# Patient Record
Sex: FEMALE | Race: WHITE | NOT HISPANIC OR LATINO
[De-identification: names, ages, dates, MRNs, and addresses within clinical notes are randomized per-mention and may not be internally consistent; named-entity substitution may affect disease eponyms.]

---

## 2019-02-21 ENCOUNTER — APPOINTMENT (OUTPATIENT)
Dept: RHEUMATOLOGY | Facility: CLINIC | Age: 54
End: 2019-02-21

## 2019-09-09 ENCOUNTER — TRANSCRIPTION ENCOUNTER (OUTPATIENT)
Age: 54
End: 2019-09-09

## 2019-10-03 ENCOUNTER — INPATIENT (INPATIENT)
Facility: HOSPITAL | Age: 54
LOS: 1 days | Discharge: HOME | DRG: 247 | End: 2019-10-05
Attending: INTERNAL MEDICINE | Admitting: INTERNAL MEDICINE
Payer: MEDICAID

## 2019-10-03 VITALS
HEART RATE: 102 BPM | TEMPERATURE: 99 F | HEIGHT: 69 IN | RESPIRATION RATE: 18 BRPM | DIASTOLIC BLOOD PRESSURE: 89 MMHG | WEIGHT: 225.09 LBS | OXYGEN SATURATION: 96 % | SYSTOLIC BLOOD PRESSURE: 166 MMHG

## 2019-10-03 DIAGNOSIS — R07.9 CHEST PAIN, UNSPECIFIED: ICD-10-CM

## 2019-10-03 LAB
ALBUMIN SERPL ELPH-MCNC: 4.2 G/DL — SIGNIFICANT CHANGE UP (ref 3.3–5)
ALP SERPL-CCNC: 85 U/L — SIGNIFICANT CHANGE UP (ref 40–120)
ALT FLD-CCNC: 26 U/L — SIGNIFICANT CHANGE UP (ref 10–45)
ANION GAP SERPL CALC-SCNC: 9 MMOL/L — SIGNIFICANT CHANGE UP (ref 5–17)
AST SERPL-CCNC: 15 U/L — SIGNIFICANT CHANGE UP (ref 10–40)
BASE EXCESS BLDV CALC-SCNC: 4.3 MMOL/L — HIGH (ref -2–2)
BILIRUB SERPL-MCNC: 0.2 MG/DL — SIGNIFICANT CHANGE UP (ref 0.2–1.2)
BUN SERPL-MCNC: 14 MG/DL — SIGNIFICANT CHANGE UP (ref 7–23)
CA-I SERPL-SCNC: 1.28 MMOL/L — SIGNIFICANT CHANGE UP (ref 1.12–1.3)
CALCIUM SERPL-MCNC: 9.7 MG/DL — SIGNIFICANT CHANGE UP (ref 8.4–10.5)
CHLORIDE BLDV-SCNC: 105 MMOL/L — SIGNIFICANT CHANGE UP (ref 96–108)
CHLORIDE SERPL-SCNC: 102 MMOL/L — SIGNIFICANT CHANGE UP (ref 96–108)
CK MB CFR SERPL CALC: 1.2 NG/ML — SIGNIFICANT CHANGE UP (ref 0–3.8)
CK SERPL-CCNC: 50 U/L — SIGNIFICANT CHANGE UP (ref 25–170)
CO2 BLDV-SCNC: 31 MMOL/L — HIGH (ref 22–30)
CO2 SERPL-SCNC: 27 MMOL/L — SIGNIFICANT CHANGE UP (ref 22–31)
CREAT SERPL-MCNC: 0.51 MG/DL — SIGNIFICANT CHANGE UP (ref 0.5–1.3)
GAS PNL BLDV: 136 MMOL/L — SIGNIFICANT CHANGE UP (ref 135–145)
GAS PNL BLDV: SIGNIFICANT CHANGE UP
GAS PNL BLDV: SIGNIFICANT CHANGE UP
GLUCOSE BLDC GLUCOMTR-MCNC: 207 MG/DL — HIGH (ref 70–99)
GLUCOSE BLDV-MCNC: 156 MG/DL — HIGH (ref 70–99)
GLUCOSE SERPL-MCNC: 165 MG/DL — HIGH (ref 70–99)
HCO3 BLDV-SCNC: 30 MMOL/L — HIGH (ref 21–29)
HCT VFR BLD CALC: 38.9 % — SIGNIFICANT CHANGE UP (ref 34.5–45)
HCT VFR BLDA CALC: 42 % — SIGNIFICANT CHANGE UP (ref 39–50)
HGB BLD CALC-MCNC: 13.6 G/DL — SIGNIFICANT CHANGE UP (ref 11.5–15.5)
HGB BLD-MCNC: 13.2 G/DL — SIGNIFICANT CHANGE UP (ref 11.5–15.5)
LACTATE BLDV-MCNC: 1 MMOL/L — SIGNIFICANT CHANGE UP (ref 0.7–2)
MCHC RBC-ENTMCNC: 31.2 PG — SIGNIFICANT CHANGE UP (ref 27–34)
MCHC RBC-ENTMCNC: 33.9 GM/DL — SIGNIFICANT CHANGE UP (ref 32–36)
MCV RBC AUTO: 92 FL — SIGNIFICANT CHANGE UP (ref 80–100)
NRBC # BLD: 0 /100 WBCS — SIGNIFICANT CHANGE UP (ref 0–0)
PCO2 BLDV: 50 MMHG — SIGNIFICANT CHANGE UP (ref 35–50)
PH BLDV: 7.39 — SIGNIFICANT CHANGE UP (ref 7.35–7.45)
PLATELET # BLD AUTO: 239 K/UL — SIGNIFICANT CHANGE UP (ref 150–400)
PO2 BLDV: 24 MMHG — LOW (ref 25–45)
POTASSIUM BLDV-SCNC: 3.7 MMOL/L — SIGNIFICANT CHANGE UP (ref 3.5–5.3)
POTASSIUM SERPL-MCNC: 3.9 MMOL/L — SIGNIFICANT CHANGE UP (ref 3.5–5.3)
POTASSIUM SERPL-SCNC: 3.9 MMOL/L — SIGNIFICANT CHANGE UP (ref 3.5–5.3)
PROT SERPL-MCNC: 7.1 G/DL — SIGNIFICANT CHANGE UP (ref 6–8.3)
RBC # BLD: 4.23 M/UL — SIGNIFICANT CHANGE UP (ref 3.8–5.2)
RBC # FLD: 12.2 % — SIGNIFICANT CHANGE UP (ref 10.3–14.5)
SAO2 % BLDV: 42 % — LOW (ref 67–88)
SODIUM SERPL-SCNC: 138 MMOL/L — SIGNIFICANT CHANGE UP (ref 135–145)
TROPONIN T, HIGH SENSITIVITY RESULT: <6 NG/L — SIGNIFICANT CHANGE UP (ref 0–51)
TROPONIN T, HIGH SENSITIVITY RESULT: <6 NG/L — SIGNIFICANT CHANGE UP (ref 0–51)
WBC # BLD: 7.17 K/UL — SIGNIFICANT CHANGE UP (ref 3.8–10.5)
WBC # FLD AUTO: 7.17 K/UL — SIGNIFICANT CHANGE UP (ref 3.8–10.5)

## 2019-10-03 PROCEDURE — 93010 ELECTROCARDIOGRAM REPORT: CPT

## 2019-10-03 PROCEDURE — 99284 EMERGENCY DEPT VISIT MOD MDM: CPT

## 2019-10-03 PROCEDURE — 71046 X-RAY EXAM CHEST 2 VIEWS: CPT | Mod: 26

## 2019-10-03 RX ORDER — HEPARIN SODIUM 5000 [USP'U]/ML
5000 INJECTION INTRAVENOUS; SUBCUTANEOUS EVERY 12 HOURS
Refills: 0 | Status: DISCONTINUED | OUTPATIENT
Start: 2019-10-03 | End: 2019-10-05

## 2019-10-03 RX ORDER — INSULIN LISPRO 100/ML
VIAL (ML) SUBCUTANEOUS
Refills: 0 | Status: DISCONTINUED | OUTPATIENT
Start: 2019-10-03 | End: 2019-10-05

## 2019-10-03 RX ORDER — ASPIRIN/CALCIUM CARB/MAGNESIUM 324 MG
243 TABLET ORAL ONCE
Refills: 0 | Status: COMPLETED | OUTPATIENT
Start: 2019-10-03 | End: 2019-10-03

## 2019-10-03 RX ORDER — ATORVASTATIN CALCIUM 80 MG/1
80 TABLET, FILM COATED ORAL AT BEDTIME
Refills: 0 | Status: DISCONTINUED | OUTPATIENT
Start: 2019-10-03 | End: 2019-10-05

## 2019-10-03 RX ORDER — DEXTROSE 50 % IN WATER 50 %
12.5 SYRINGE (ML) INTRAVENOUS ONCE
Refills: 0 | Status: DISCONTINUED | OUTPATIENT
Start: 2019-10-03 | End: 2019-10-05

## 2019-10-03 RX ORDER — DEXTROSE 50 % IN WATER 50 %
25 SYRINGE (ML) INTRAVENOUS ONCE
Refills: 0 | Status: DISCONTINUED | OUTPATIENT
Start: 2019-10-03 | End: 2019-10-05

## 2019-10-03 RX ORDER — ASPIRIN/CALCIUM CARB/MAGNESIUM 324 MG
81 TABLET ORAL ONCE
Refills: 0 | Status: DISCONTINUED | OUTPATIENT
Start: 2019-10-03 | End: 2019-10-03

## 2019-10-03 RX ORDER — LOSARTAN POTASSIUM 100 MG/1
50 TABLET, FILM COATED ORAL DAILY
Refills: 0 | Status: DISCONTINUED | OUTPATIENT
Start: 2019-10-03 | End: 2019-10-05

## 2019-10-03 RX ORDER — DEXTROSE 50 % IN WATER 50 %
15 SYRINGE (ML) INTRAVENOUS ONCE
Refills: 0 | Status: DISCONTINUED | OUTPATIENT
Start: 2019-10-03 | End: 2019-10-05

## 2019-10-03 RX ORDER — INSULIN GLARGINE 100 [IU]/ML
10 INJECTION, SOLUTION SUBCUTANEOUS AT BEDTIME
Refills: 0 | Status: DISCONTINUED | OUTPATIENT
Start: 2019-10-03 | End: 2019-10-05

## 2019-10-03 RX ORDER — GLUCAGON INJECTION, SOLUTION 0.5 MG/.1ML
1 INJECTION, SOLUTION SUBCUTANEOUS ONCE
Refills: 0 | Status: DISCONTINUED | OUTPATIENT
Start: 2019-10-03 | End: 2019-10-05

## 2019-10-03 RX ORDER — SODIUM CHLORIDE 9 MG/ML
1000 INJECTION, SOLUTION INTRAVENOUS
Refills: 0 | Status: DISCONTINUED | OUTPATIENT
Start: 2019-10-03 | End: 2019-10-05

## 2019-10-03 RX ORDER — METOPROLOL TARTRATE 50 MG
25 TABLET ORAL
Refills: 0 | Status: DISCONTINUED | OUTPATIENT
Start: 2019-10-03 | End: 2019-10-05

## 2019-10-03 RX ORDER — ASPIRIN/CALCIUM CARB/MAGNESIUM 324 MG
81 TABLET ORAL DAILY
Refills: 0 | Status: DISCONTINUED | OUTPATIENT
Start: 2019-10-03 | End: 2019-10-05

## 2019-10-03 RX ADMIN — Medication 243 MILLIGRAM(S): at 19:34

## 2019-10-03 RX ADMIN — INSULIN GLARGINE 10 UNIT(S): 100 INJECTION, SOLUTION SUBCUTANEOUS at 22:48

## 2019-10-03 NOTE — ED PROVIDER NOTE - BIRTH SEX
Continue current warfarin and repeat IINR in 3 days.     Hilda Mckeon MD    
Noted  Forwarded to [facility].    Anticoag Track is updated    
Notification received from [facility] regarding Pt  Pt location at SNF: [address]    Assessment: Pt had PT/INR drawn  Results:  INR: 2.77  PT: 31.1    Current dose of Coumadin: 2 mg Tues/Thurs; 1 mg ROW  Last INR: 1.5 on 3/28    Any bleeding or bruising concerns: no  Any medication changes: no    Anticoag tracking form updated with result    Any recommendations or new orders?      
Female

## 2019-10-03 NOTE — H&P ADULT - ASSESSMENT
pt with  hx of htn, DM, strong fhx for cad with c/o intermittent new onset  of chest pain s/p recent stress test was supposed to be cath as out p[t , but developed chest pain again and was told to come to ER.  pt pain free now.  chest pain r/o ACVS  cardiac enzymes  asa beta blocker  lipitor  tsh  any chest pain will start iv heparin  DM fs with coverage

## 2019-10-03 NOTE — ED PROVIDER NOTE - OBJECTIVE STATEMENT
54 y female PMHx DM, HTN, fibromyalgia presenting for abnormal nuclear stress test last PM sent in from Dr Omar Villarreal -  was advised to "eat dinner today then come in" given pts diabetic status. Pt currently has intermittent "sharp" chest pain midsternal with mild SOB. Admits to occasional nausea, paresthesias around her mouth and left jaw pain. Denies exertional CP, postprandial CP, syncope, weakness, palpitations. Pt has positive FH - mother  6 months ago from stroke. father  10 years ago in his sleep suspected cardiac arrest.

## 2019-10-03 NOTE — ED PROVIDER NOTE - ATTENDING CONTRIBUTION TO CARE
I performed a history and physical exam of the patient and discussed their management with the PA.  I reviewed the PA's note and agree with the documented findings and plan of care except as noted below. My medical decision making and observations are as follows:     54F PMH DM, HTN, fibromyalgia presenting for abnormal nuclear stress test last PM sent in from Dr Nelson.  Pt currently has intermittent "sharp" chest pain midsternal with mild SOB. Admits to occasional nausea.  no radiation of pain.  patient currently endorses a slight discomfort 2/10.  States she is very anxious about coming to the hospital.  Endorses family hx of heart disease and stroke.  Pt in NAD, A&O x 3, heart mildly tachycardic, lungs cta, ab soft ntnd, no peripheral edema.  Concern for acs given abnormal stress - will check labs and admit for further cardiac work up.

## 2019-10-03 NOTE — ED ADULT NURSE NOTE - OBJECTIVE STATEMENT
54 year old female presents to the ED via walk in with c/o CP. Pt endorses an episode of CP and SOB a few days ago that prompted her to see a cardiologist,  found to have abnormal nuc stress test and EKG at cardiologist today and sent in for further cardiac work up and admission. Patient has no c/o CP/SOB at this time, denies any pain or discomfort, no N/V/D or diaphoresis. EKG done in traige, RSR-ST noted on tele monitor. Comfort and safety measures maintained.

## 2019-10-03 NOTE — ED PROVIDER NOTE - CLINICAL SUMMARY MEDICAL DECISION MAKING FREE TEXT BOX
Pt with chest pain and abnormal nuclear stress test sent in to the ER for admission.  WIll check labs, ekg and admit.

## 2019-10-03 NOTE — ED PROVIDER NOTE - PROGRESS NOTE DETAILS
Spoke to Dr Phil Nelson regarding his pt - advised admit for angiogram for inferior ischemia on nuclear stress test. Will admit to Juan Alberto Nelson. Brice Huddleston PA-C

## 2019-10-03 NOTE — ED PROVIDER NOTE - FAMILY HISTORY
Mother  Still living? No  Family history of stroke, Age at diagnosis: Age Unknown     Father  Still living? No  Family history of cardiac arrest, Age at diagnosis: Age Unknown

## 2019-10-03 NOTE — H&P ADULT - HISTORY OF PRESENT ILLNESS
CHIEF COMPLAINT:Patient is a 54y old  Female who presents with a chief complaint of chest pain.    HPI:  54 y female PMHx DM, HTN, fibromyalgia presenting for abnormal nuclear stress test last PM sent in from Dr Omar Villarreal -  was advised to "eat dinner today then come in" given pts diabetic status. Pt currently has intermittent "sharp" chest pain midsternal with mild SOB. Admits to occasional nausea, paresthesias around her mouth and left jaw pain. Denies exertional CP, postprandial CP, syncope, weakness, palpitations. Pt has positive FH - mother  6 months ago from stroke. father  10 years ago in his sleep suspected cardiac    PAST MEDICAL & SURGICAL HISTORY:  Fibromyalgia  Hypertension  Diabetes  No significant past surgical history      MEDICATIONS  (STANDING):    MEDICATIONS  (PRN):      FAMILY HISTORY:  Family history of cardiac arrest  Family history of stroke      SOCIAL HISTORY:    [ ] Non-smoker  [ ] Smoker  [ ] Alcohol    Allergies    Cipro (Short breath)    Intolerances    	    REVIEW OF SYSTEMS:  CONSTITUTIONAL: No fever, weight loss, or fatigue  EYES: No eye pain, visual disturbances, or discharge  ENT:  No difficulty hearing, tinnitus, vertigo; No sinus or throat pain  NECK: No pain or stiffness  RESPIRATORY: No cough, wheezing, chills or hemoptysis; + exertional  Shortness of Breath  CARDIOVASCULAR: No chest pain, palpitations, passing out, dizziness, or leg swelling  GASTROINTESTINAL: No abdominal or epigastric pain. No nausea, vomiting, or hematemesis; No diarrhea or constipation. No melena or hematochezia.  GENITOURINARY: No dysuria, frequency, hematuria, or incontinence  NEUROLOGICAL: No headaches, memory loss, loss of strength, numbness, or tremors  SKIN: No itching, burning, rashes, or lesions   LYMPH Nodes: No enlarged glands  ENDOCRINE: No heat or cold intolerance; No hair loss  MUSCULOSKELETAL: No joint pain or swelling; No muscle, back, or extremity pain  PSYCHIATRIC: No depression, anxiety, mood swings, or difficulty sleeping  HEME/LYMPH: No easy bruising, or bleeding gums  ALLERGY AND IMMUNOLOGIC: No hives or eczema	    [ ] All others negative	  [ ] Unable to obtain    PHYSICAL EXAM:  T(C): 37.1 (10-03-19 @ 18:39), Max: 37.1 (10-03-19 @ 18:39)  HR: 87 (10-03-19 @ 19:59) (87 - 102)  BP: 147/87 (10-03-19 @ 19:59) (147/87 - 166/89)  RR: 18 (10-03-19 @ 19:59) (18 - 18)  SpO2: 98% (10-03-19 @ 19:59) (96% - 98%)  Wt(kg): --  I&O's Summary      Appearance: Normal	  HEENT:   Normal oral mucosa, PERRL, EOMI	  Lymphatic: No lymphadenopathy  Cardiovascular: Normal S1 S2, No JVD, + murmurs, No edema  Respiratory: rhonchi  Psychiatry: A & O x 3, Mood & affect appropriate  Gastrointestinal:  Soft, Non-tender, + BS	  Skin: No rashes, No ecchymoses, No cyanosis	  Neurologic: Non-focal  Extremities: Normal range of motion, No clubbing, cyanosis or edema  Vascular: Peripheral pulses palpable 2+ bilaterally    TELEMETRY: 	    ECG:  	  RADIOLOGY:  OTHER: 	  	  LABS:	 	    CARDIAC MARKERS:                              13.2   7.17  )-----------( 239      ( 03 Oct 2019 19:22 )             38.9     10    138  |  102  |  14  ----------------------------<  165<H>  3.9   |  27  |  0.51    Ca    9.7      03 Oct 2019 19:22    TPro  7.1  /  Alb  4.2  /  TBili  0.2  /  DBili  x   /  AST  15  /  ALT  26  /  AlkPhos  85  10-    proBNP:   Lipid Profile:   HgA1c:   TSH:       PREVIOUS DIAGNOSTIC TESTING:      < from: Xray Chest 2 Views PA/Lat (10.03.19 @ 19:44) >  INTERPRETATION:  Clear lungs    < end of copied text >

## 2019-10-04 LAB
ALBUMIN SERPL ELPH-MCNC: 3.8 G/DL — SIGNIFICANT CHANGE UP (ref 3.3–5)
ALP SERPL-CCNC: 78 U/L — SIGNIFICANT CHANGE UP (ref 40–120)
ALT FLD-CCNC: 25 U/L — SIGNIFICANT CHANGE UP (ref 10–45)
ANION GAP SERPL CALC-SCNC: 10 MMOL/L — SIGNIFICANT CHANGE UP (ref 5–17)
APPEARANCE UR: CLEAR — SIGNIFICANT CHANGE UP
AST SERPL-CCNC: 16 U/L — SIGNIFICANT CHANGE UP (ref 10–40)
BACTERIA # UR AUTO: NEGATIVE — SIGNIFICANT CHANGE UP
BILIRUB SERPL-MCNC: 0.3 MG/DL — SIGNIFICANT CHANGE UP (ref 0.2–1.2)
BILIRUB UR-MCNC: NEGATIVE — SIGNIFICANT CHANGE UP
BUN SERPL-MCNC: 16 MG/DL — SIGNIFICANT CHANGE UP (ref 7–23)
CALCIUM SERPL-MCNC: 9.7 MG/DL — SIGNIFICANT CHANGE UP (ref 8.4–10.5)
CHLORIDE SERPL-SCNC: 104 MMOL/L — SIGNIFICANT CHANGE UP (ref 96–108)
CHOLEST SERPL-MCNC: 172 MG/DL — SIGNIFICANT CHANGE UP (ref 10–199)
CO2 SERPL-SCNC: 25 MMOL/L — SIGNIFICANT CHANGE UP (ref 22–31)
COLOR SPEC: YELLOW — SIGNIFICANT CHANGE UP
CREAT SERPL-MCNC: 0.41 MG/DL — LOW (ref 0.5–1.3)
DIFF PNL FLD: ABNORMAL
EPI CELLS # UR: 0 /HPF — SIGNIFICANT CHANGE UP
GLUCOSE BLDC GLUCOMTR-MCNC: 191 MG/DL — HIGH (ref 70–99)
GLUCOSE BLDC GLUCOMTR-MCNC: 194 MG/DL — HIGH (ref 70–99)
GLUCOSE BLDC GLUCOMTR-MCNC: 217 MG/DL — HIGH (ref 70–99)
GLUCOSE BLDC GLUCOMTR-MCNC: 217 MG/DL — HIGH (ref 70–99)
GLUCOSE SERPL-MCNC: 130 MG/DL — HIGH (ref 70–99)
GLUCOSE UR QL: ABNORMAL
HBA1C BLD-MCNC: 8.5 % — HIGH (ref 4–5.6)
HCT VFR BLD CALC: 39.5 % — SIGNIFICANT CHANGE UP (ref 34.5–45)
HCV AB S/CO SERPL IA: 0.17 S/CO — SIGNIFICANT CHANGE UP (ref 0–0.99)
HCV AB SERPL-IMP: SIGNIFICANT CHANGE UP
HDLC SERPL-MCNC: 45 MG/DL — LOW
HGB BLD-MCNC: 13 G/DL — SIGNIFICANT CHANGE UP (ref 11.5–15.5)
HYALINE CASTS # UR AUTO: 7 /LPF — HIGH (ref 0–2)
KETONES UR-MCNC: NEGATIVE — SIGNIFICANT CHANGE UP
LEUKOCYTE ESTERASE UR-ACNC: ABNORMAL
LIPID PNL WITH DIRECT LDL SERPL: 104 MG/DL — HIGH
MCHC RBC-ENTMCNC: 30.5 PG — SIGNIFICANT CHANGE UP (ref 27–34)
MCHC RBC-ENTMCNC: 32.9 GM/DL — SIGNIFICANT CHANGE UP (ref 32–36)
MCV RBC AUTO: 92.7 FL — SIGNIFICANT CHANGE UP (ref 80–100)
NITRITE UR-MCNC: NEGATIVE — SIGNIFICANT CHANGE UP
NRBC # BLD: 0 /100 WBCS — SIGNIFICANT CHANGE UP (ref 0–0)
PH UR: 5.5 — SIGNIFICANT CHANGE UP (ref 5–8)
PLATELET # BLD AUTO: 229 K/UL — SIGNIFICANT CHANGE UP (ref 150–400)
POTASSIUM SERPL-MCNC: 3.8 MMOL/L — SIGNIFICANT CHANGE UP (ref 3.5–5.3)
POTASSIUM SERPL-SCNC: 3.8 MMOL/L — SIGNIFICANT CHANGE UP (ref 3.5–5.3)
PROT SERPL-MCNC: 6.6 G/DL — SIGNIFICANT CHANGE UP (ref 6–8.3)
PROT UR-MCNC: NEGATIVE — SIGNIFICANT CHANGE UP
RBC # BLD: 4.26 M/UL — SIGNIFICANT CHANGE UP (ref 3.8–5.2)
RBC # FLD: 12.2 % — SIGNIFICANT CHANGE UP (ref 10.3–14.5)
RBC CASTS # UR COMP ASSIST: 2 /HPF — SIGNIFICANT CHANGE UP (ref 0–4)
SODIUM SERPL-SCNC: 139 MMOL/L — SIGNIFICANT CHANGE UP (ref 135–145)
SP GR SPEC: 1.02 — SIGNIFICANT CHANGE UP (ref 1.01–1.02)
TOTAL CHOLESTEROL/HDL RATIO MEASUREMENT: 3.8 RATIO — SIGNIFICANT CHANGE UP (ref 3.3–7.1)
TRIGL SERPL-MCNC: 115 MG/DL — SIGNIFICANT CHANGE UP (ref 10–149)
TROPONIN T, HIGH SENSITIVITY RESULT: <6 NG/L — SIGNIFICANT CHANGE UP (ref 0–51)
TSH SERPL-MCNC: 1.46 UIU/ML — SIGNIFICANT CHANGE UP (ref 0.27–4.2)
UROBILINOGEN FLD QL: NEGATIVE — SIGNIFICANT CHANGE UP
WBC # BLD: 5.48 K/UL — SIGNIFICANT CHANGE UP (ref 3.8–10.5)
WBC # FLD AUTO: 5.48 K/UL — SIGNIFICANT CHANGE UP (ref 3.8–10.5)
WBC UR QL: 31 /HPF — HIGH (ref 0–5)

## 2019-10-04 PROCEDURE — 93454 CORONARY ARTERY ANGIO S&I: CPT | Mod: 26,59

## 2019-10-04 PROCEDURE — 93010 ELECTROCARDIOGRAM REPORT: CPT

## 2019-10-04 PROCEDURE — 92928 PRQ TCAT PLMT NTRAC ST 1 LES: CPT | Mod: LD

## 2019-10-04 RX ORDER — CLOPIDOGREL BISULFATE 75 MG/1
75 TABLET, FILM COATED ORAL DAILY
Refills: 0 | Status: DISCONTINUED | OUTPATIENT
Start: 2019-10-05 | End: 2019-10-05

## 2019-10-04 RX ADMIN — Medication 81 MILLIGRAM(S): at 11:50

## 2019-10-04 RX ADMIN — LOSARTAN POTASSIUM 50 MILLIGRAM(S): 100 TABLET, FILM COATED ORAL at 08:20

## 2019-10-04 RX ADMIN — Medication 2: at 08:20

## 2019-10-04 RX ADMIN — INSULIN GLARGINE 10 UNIT(S): 100 INJECTION, SOLUTION SUBCUTANEOUS at 22:04

## 2019-10-04 RX ADMIN — Medication 1: at 14:22

## 2019-10-04 NOTE — PROVIDER CONTACT NOTE (MEDICATION) - SITUATION
Pt continuing to refusing heparin and metoprolol. As per pt , she wants to discuss taking these new medications w/ he PCP.

## 2019-10-04 NOTE — PROVIDER CONTACT NOTE (OTHER) - ASSESSMENT
Pt A&Ox4, VSS, SR on tele, Denies cp, sob, and distress, Pt refusing medication, Pt expressed concern for possible medication reaction since she has not taken Metoprolol and Lipitor prior to hospitalization

## 2019-10-04 NOTE — PROGRESS NOTE ADULT - SUBJECTIVE AND OBJECTIVE BOX
CARDIOLOGY     PROGRESS  NOTE   ________________________________________________    CHIEF COMPLAINT:Patient is a 54y old  Female who presents with a chief complaint of chest pain (03 Oct 2019 20:38)    	  REVIEW OF SYSTEMS:  CONSTITUTIONAL: No fever, weight loss, or fatigue  EYES: No eye pain, visual disturbances, or discharge  ENT:  No difficulty hearing, tinnitus, vertigo; No sinus or throat pain  NECK: No pain or stiffness  RESPIRATORY: No cough, wheezing, chills or hemoptysis; No Shortness of Breath  CARDIOVASCULAR: No chest pain, palpitations, passing out, dizziness, or leg swelling  GASTROINTESTINAL: No abdominal or epigastric pain. No nausea, vomiting, or hematemesis; No diarrhea or constipation. No melena or hematochezia.  GENITOURINARY: No dysuria, frequency, hematuria, or incontinence  NEUROLOGICAL: No headaches, memory loss, loss of strength, numbness, or tremors  SKIN: No itching, burning, rashes, or lesions   LYMPH Nodes: No enlarged glands  ENDOCRINE: No heat or cold intolerance; No hair loss  MUSCULOSKELETAL: No joint pain or swelling; No muscle, back, or extremity pain  PSYCHIATRIC: No depression, anxiety, mood swings, or difficulty sleeping  HEME/LYMPH: No easy bruising, or bleeding gums  ALLERGY AND IMMUNOLOGIC: No hives or eczema	    [ ] All others negative	  [ ] Unable to obtain    PHYSICAL EXAM:  T(C): 36.8 (10-04-19 @ 05:30), Max: 37.1 (10-03-19 @ 18:39)  HR: 70 (10-04-19 @ 05:30) (70 - 102)  BP: 148/80 (10-04-19 @ 05:30) (147/87 - 166/89)  RR: 17 (10-04-19 @ 05:30) (17 - 18)  SpO2: 97% (10-04-19 @ 05:30) (96% - 98%)  Wt(kg): --  I&O's Summary      Appearance: Normal	  HEENT:   Normal oral mucosa, PERRL, EOMI	  Lymphatic: No lymphadenopathy  Cardiovascular: Normal S1 S2, No JVD, No murmurs, No edema  Respiratory: Lungs clear to auscultation	  Psychiatry: A & O x 3, Mood & affect appropriate  Gastrointestinal:  Soft, Non-tender, + BS	  Skin: No rashes, No ecchymoses, No cyanosis	  Neurologic: Non-focal  Extremities: Normal range of motion, No clubbing, cyanosis or edema  Vascular: Peripheral pulses palpable 2+ bilaterally    MEDICATIONS  (STANDING):  aspirin enteric coated 81 milliGRAM(s) Oral daily  atorvastatin 80 milliGRAM(s) Oral at bedtime  dextrose 5%. 1000 milliLiter(s) (50 mL/Hr) IV Continuous <Continuous>  dextrose 50% Injectable 12.5 Gram(s) IV Push once  dextrose 50% Injectable 25 Gram(s) IV Push once  dextrose 50% Injectable 25 Gram(s) IV Push once  heparin  Injectable 5000 Unit(s) SubCutaneous every 12 hours  insulin glargine Injectable (LANTUS) 10 Unit(s) SubCutaneous at bedtime  insulin lispro (HumaLOG) corrective regimen sliding scale   SubCutaneous three times a day before meals  losartan 50 milliGRAM(s) Oral daily  metoprolol tartrate 25 milliGRAM(s) Oral two times a day      TELEMETRY: 	    ECG:  	  RADIOLOGY:  OTHER: 	  	  LABS:	 	    CARDIAC MARKERS:  CARDIAC MARKERS ( 03 Oct 2019 22:31 )  x     / x     / 50 U/L / x     / 1.2 ng/mL                                13.0   5.48  )-----------( 229      ( 04 Oct 2019 07:47 )             39.5     10-04    139  |  104  |  16  ----------------------------<  130<H>  3.8   |  25  |  0.41<L>    Ca    9.7      04 Oct 2019 07:08    TPro  6.6  /  Alb  3.8  /  TBili  0.3  /  DBili  x   /  AST  16  /  ALT  25  /  AlkPhos  78  10-04    proBNP:   Lipid Profile:   HgA1c:   TSH:         Assessment and plan  ---------------------------  unstable angina  awaiting cath today continue asa/beta blocker

## 2019-10-04 NOTE — PROVIDER CONTACT NOTE (OTHER) - ACTION/TREATMENT ORDERED:
PA notified, Pt educated on importance of beta blocker and statin, Pt given written material on both medications, Pt states understanding, Will continue to monitor

## 2019-10-04 NOTE — PROVIDER CONTACT NOTE (MEDICATION) - ASSESSMENT
Pt denies current CP, SOB, N/V, dizziness. VSS (see flow sheet)  Education provided on importance of medication and current diagnosis.

## 2019-10-04 NOTE — CHART NOTE - NSCHARTNOTEFT_GEN_A_CORE
Patient underwent a PCI procedure and is being admitted as they are at increased risk for major adverse cardiac and vascular events if discharged due to the following high risk characteristics:  for pLAD lesion

## 2019-10-05 ENCOUNTER — TRANSCRIPTION ENCOUNTER (OUTPATIENT)
Age: 54
End: 2019-10-05

## 2019-10-05 VITALS
TEMPERATURE: 98 F | DIASTOLIC BLOOD PRESSURE: 76 MMHG | SYSTOLIC BLOOD PRESSURE: 136 MMHG | OXYGEN SATURATION: 97 % | HEART RATE: 83 BPM | RESPIRATION RATE: 18 BRPM

## 2019-10-05 LAB
ANION GAP SERPL CALC-SCNC: 10 MMOL/L — SIGNIFICANT CHANGE UP (ref 5–17)
BUN SERPL-MCNC: 16 MG/DL — SIGNIFICANT CHANGE UP (ref 7–23)
CALCIUM SERPL-MCNC: 9.6 MG/DL — SIGNIFICANT CHANGE UP (ref 8.4–10.5)
CHLORIDE SERPL-SCNC: 104 MMOL/L — SIGNIFICANT CHANGE UP (ref 96–108)
CO2 SERPL-SCNC: 25 MMOL/L — SIGNIFICANT CHANGE UP (ref 22–31)
CREAT SERPL-MCNC: 0.48 MG/DL — LOW (ref 0.5–1.3)
GLUCOSE BLDC GLUCOMTR-MCNC: 172 MG/DL — HIGH (ref 70–99)
GLUCOSE SERPL-MCNC: 172 MG/DL — HIGH (ref 70–99)
HCT VFR BLD CALC: 39.8 % — SIGNIFICANT CHANGE UP (ref 34.5–45)
HGB BLD-MCNC: 13.2 G/DL — SIGNIFICANT CHANGE UP (ref 11.5–15.5)
MCHC RBC-ENTMCNC: 30.8 PG — SIGNIFICANT CHANGE UP (ref 27–34)
MCHC RBC-ENTMCNC: 33.2 GM/DL — SIGNIFICANT CHANGE UP (ref 32–36)
MCV RBC AUTO: 93 FL — SIGNIFICANT CHANGE UP (ref 80–100)
NRBC # BLD: 0 /100 WBCS — SIGNIFICANT CHANGE UP (ref 0–0)
PLATELET # BLD AUTO: 195 K/UL — SIGNIFICANT CHANGE UP (ref 150–400)
POTASSIUM SERPL-MCNC: 4.4 MMOL/L — SIGNIFICANT CHANGE UP (ref 3.5–5.3)
POTASSIUM SERPL-SCNC: 4.4 MMOL/L — SIGNIFICANT CHANGE UP (ref 3.5–5.3)
RBC # BLD: 4.28 M/UL — SIGNIFICANT CHANGE UP (ref 3.8–5.2)
RBC # FLD: 12.2 % — SIGNIFICANT CHANGE UP (ref 10.3–14.5)
SODIUM SERPL-SCNC: 139 MMOL/L — SIGNIFICANT CHANGE UP (ref 135–145)
WBC # BLD: 6.62 K/UL — SIGNIFICANT CHANGE UP (ref 3.8–10.5)
WBC # FLD AUTO: 6.62 K/UL — SIGNIFICANT CHANGE UP (ref 3.8–10.5)

## 2019-10-05 RX ORDER — METOPROLOL TARTRATE 50 MG
1 TABLET ORAL
Qty: 30 | Refills: 0
Start: 2019-10-05 | End: 2019-11-03

## 2019-10-05 RX ORDER — CLOPIDOGREL BISULFATE 75 MG/1
1 TABLET, FILM COATED ORAL
Qty: 30 | Refills: 0
Start: 2019-10-05 | End: 2019-11-03

## 2019-10-05 RX ORDER — ASPIRIN/CALCIUM CARB/MAGNESIUM 324 MG
1 TABLET ORAL
Qty: 0 | Refills: 0 | DISCHARGE
Start: 2019-10-05

## 2019-10-05 RX ORDER — ASPIRIN/CALCIUM CARB/MAGNESIUM 324 MG
1 TABLET ORAL
Qty: 0 | Refills: 0 | DISCHARGE

## 2019-10-05 RX ORDER — ATORVASTATIN CALCIUM 80 MG/1
1 TABLET, FILM COATED ORAL
Qty: 30 | Refills: 0
Start: 2019-10-05 | End: 2019-11-03

## 2019-10-05 RX ADMIN — Medication 1: at 07:39

## 2019-10-05 RX ADMIN — LOSARTAN POTASSIUM 50 MILLIGRAM(S): 100 TABLET, FILM COATED ORAL at 07:38

## 2019-10-05 RX ADMIN — Medication 81 MILLIGRAM(S): at 11:30

## 2019-10-05 RX ADMIN — CLOPIDOGREL BISULFATE 75 MILLIGRAM(S): 75 TABLET, FILM COATED ORAL at 11:30

## 2019-10-05 NOTE — DISCHARGE NOTE NURSING/CASE MANAGEMENT/SOCIAL WORK - NSDCPEWEB_GEN_ALL_CORE
[place] website --- [email or web address] for Tobacco Control website --- [email or web address]

## 2019-10-05 NOTE — DISCHARGE NOTE NURSING/CASE MANAGEMENT/SOCIAL WORK - NSDCPEEMAIL_GEN_ALL_CORE
[facility] for Tobacco Control email [email or web address]

## 2019-10-05 NOTE — DISCHARGE NOTE PROVIDER - NSDCCPCAREPLAN_GEN_ALL_CORE_FT
PRINCIPAL DISCHARGE DIAGNOSIS  Diagnosis: Chest pain  Assessment and Plan of Treatment: You have had a cardiac stent placed. It is CRITICAL that you take your Plavix (Clopidogrel) exactly as prescribed and do NOT miss any doses as this could result in your stent closing. If you develop unusual bleeding or bruising notify your Cardiologist. You may bruise more easily so try to avoid injuries. Take your medications as prescribed and follow up with your Primary MD within 1 week and Cardiologist Dr Nelson in 2 weeks. Call offices for appointments.      SECONDARY DISCHARGE DIAGNOSES  Diagnosis: Type 2 diabetes mellitus  Assessment and Plan of Treatment: Continue diabetic medications and regime as previously prescribed and follow up with your Primary MD for continued management within 1 week.

## 2019-10-05 NOTE — PROGRESS NOTE ADULT - SUBJECTIVE AND OBJECTIVE BOX
CARDIOLOGY     PROGRESS  NOTE   ________________________________________________    CHIEF COMPLAINT:Patient is a 54y old  Female who presents with a chief complaint of chest pain (04 Oct 2019 09:27)  doing well, no complain.  	  REVIEW OF SYSTEMS:  CONSTITUTIONAL: No fever, weight loss, or fatigue  EYES: No eye pain, visual disturbances, or discharge  ENT:  No difficulty hearing, tinnitus, vertigo; No sinus or throat pain  NECK: No pain or stiffness  RESPIRATORY: No cough, wheezing, chills or hemoptysis; No Shortness of Breath  CARDIOVASCULAR: No chest pain, palpitations, passing out, dizziness, or leg swelling  GASTROINTESTINAL: No abdominal or epigastric pain. No nausea, vomiting, or hematemesis; No diarrhea or constipation. No melena or hematochezia.  GENITOURINARY: No dysuria, frequency, hematuria, or incontinence  NEUROLOGICAL: No headaches, memory loss, loss of strength, numbness, or tremors  SKIN: No itching, burning, rashes, or lesions   LYMPH Nodes: No enlarged glands  ENDOCRINE: No heat or cold intolerance; No hair loss  MUSCULOSKELETAL: No joint pain or swelling; No muscle, back, or extremity pain  PSYCHIATRIC: No depression, anxiety, mood swings, or difficulty sleeping  HEME/LYMPH: No easy bruising, or bleeding gums  ALLERGY AND IMMUNOLOGIC: No hives or eczema	    [ ] All others negative	  [ ] Unable to obtain    PHYSICAL EXAM:  T(C): 36.8 (10-05-19 @ 05:49), Max: 36.8 (10-04-19 @ 12:04)  HR: 77 (10-05-19 @ 07:37) (75 - 81)  BP: 152/75 (10-05-19 @ 07:37) (114/68 - 152/75)  RR: 18 (10-05-19 @ 05:49) (18 - 18)  SpO2: 99% (10-05-19 @ 05:49) (96% - 99%)  Wt(kg): --  I&O's Summary    04 Oct 2019 07:01  -  05 Oct 2019 07:00  --------------------------------------------------------  IN: 240 mL / OUT: 0 mL / NET: 240 mL        Appearance: Normal	  HEENT:   Normal oral mucosa, PERRL, EOMI	  Lymphatic: No lymphadenopathy  Cardiovascular: Normal S1 S2, No JVD, + murmurs, No edema  Respiratory: Lungs clear to auscultation	  Psychiatry: A & O x 3, Mood & affect appropriate  Gastrointestinal:  Soft, Non-tender, + BS	  Skin: No rashes, No ecchymoses, No cyanosis	  Neurologic: Non-focal  Extremities: Normal range of motion, No clubbing, cyanosis or edema  Vascular: Peripheral pulses palpable 2+ bilaterally    MEDICATIONS  (STANDING):  aspirin enteric coated 81 milliGRAM(s) Oral daily  atorvastatin 80 milliGRAM(s) Oral at bedtime  clopidogrel Tablet 75 milliGRAM(s) Oral daily  dextrose 5%. 1000 milliLiter(s) (50 mL/Hr) IV Continuous <Continuous>  dextrose 50% Injectable 12.5 Gram(s) IV Push once  dextrose 50% Injectable 25 Gram(s) IV Push once  dextrose 50% Injectable 25 Gram(s) IV Push once  heparin  Injectable 5000 Unit(s) SubCutaneous every 12 hours  insulin glargine Injectable (LANTUS) 10 Unit(s) SubCutaneous at bedtime  insulin lispro (HumaLOG) corrective regimen sliding scale   SubCutaneous three times a day before meals  losartan 50 milliGRAM(s) Oral daily  metoprolol tartrate 25 milliGRAM(s) Oral two times a day      TELEMETRY: 	    ECG:  	  RADIOLOGY:  OTHER: 	  	  LABS:	 	    CARDIAC MARKERS:  CARDIAC MARKERS ( 03 Oct 2019 22:31 )  x     / x     / 50 U/L / x     / 1.2 ng/mL                                13.2   6.62  )-----------( 195      ( 05 Oct 2019 06:07 )             39.8     10-05    139  |  104  |  16  ----------------------------<  172<H>  4.4   |  25  |  0.48<L>    Ca    9.6      05 Oct 2019 06:07    TPro  6.6  /  Alb  3.8  /  TBili  0.3  /  DBili  x   /  AST  16  /  ALT  25  /  AlkPhos  78  10-04    proBNP:   Lipid Profile: Cholesterol 172    HDL 45      HgA1c: Hemoglobin A1C, Whole Blood: 8.5 % (10-04 @ 11:14)    TSH: Thyroid Stimulating Hormone, Serum: 1.46 uIU/mL (10-04 @ 09:39)          Assessment and plan  ---------------------------  doing well, no complain  s/p stent of mid LAD  continue  asa and plavix  beta blocker change to ER 50 mg daily  continue arb as what she has at home fu in 2 weeks

## 2019-10-05 NOTE — DISCHARGE NOTE PROVIDER - HOSPITAL COURSE
54 y female PMHx DMT2, HTN, fibromyalgia presenting for abnormal nuclear stress test from Dr Phil Nelson office. Pt c/o intermittent "sharp" chest pain midsternal with mild SOB and occasional nausea, paresthesias around her mouth and left jaw pain. Denied exertional CP, postprandial CP, syncope, weakness, palpitations. Pt has positive FH - mother  6 months ago from stroke. father  10 years ago in his sleep suspected cardiac event.        s/p cardiac cath via Rt radial on 10/4/19 with stent of mid LAD    D/C on ASA 81, Plavix, Metoprolol change to Toprol XL 50 daily, Valsartan, Lipitor 40 daily        Stable for discharge home with F/U with PMD Dr Narciso Andujar within 1 week and Cardiologist Dr Nelson in 2 weeks.

## 2019-10-05 NOTE — DISCHARGE NOTE PROVIDER - CARE PROVIDER_API CALL
Narciso Andujar  Primary Care MD  Phone: (   )    -  Fax: (   )    -  Follow Up Time: 1 week    Phil Nelson (DO)  Cardiology Medicine  [facility], [address] [place] [address]  Phone: [phone]  Fax: [phone]  Follow Up Time: 2 weeks

## 2019-10-05 NOTE — DISCHARGE NOTE PROVIDER - PROVIDER TOKENS
FREE:[LAST:[Wyn],FIRST:[Narciso],PHONE:[(   )    -],FAX:[(   )    -],ADDRESS:[Primary Care MD],FOLLOWUP:[1 week]],PROVIDER:[TOKEN:[[id number]],FOLLOWUP:[2 weeks]]

## 2019-10-05 NOTE — DISCHARGE NOTE NURSING/CASE MANAGEMENT/SOCIAL WORK - PATIENT PORTAL LINK FT
You can access the FollowMyHealth Patient Portal offered by [facility] by registering at the following website: [email or web address]. By joining [employer]’s FollowMyHealth portal, you will also be able to view your health information using other applications (apps) compatible with our system.

## 2019-10-05 NOTE — CHART NOTE - NSCHARTNOTEFT_GEN_A_CORE
s/p mid LAD stent x 1, on ASA & Plavix. Seen and evaluated. Ambulating and tolerating well. radial site benign with no bleeding/ hematoma. Dressing changed. Plan to discharge home now. Vital signs stable

## 2019-10-19 ENCOUNTER — EMERGENCY (EMERGENCY)
Facility: HOSPITAL | Age: 54
LOS: 1 days | Discharge: HOME | End: 2019-10-19
Attending: PERSONAL EMERGENCY RESPONSE ATTENDANT
Payer: MEDICAID

## 2019-10-19 VITALS
DIASTOLIC BLOOD PRESSURE: 83 MMHG | TEMPERATURE: 99 F | HEART RATE: 90 BPM | SYSTOLIC BLOOD PRESSURE: 166 MMHG | OXYGEN SATURATION: 99 % | RESPIRATION RATE: 18 BRPM | HEIGHT: 69 IN | WEIGHT: 225.09 LBS

## 2019-10-19 VITALS
RESPIRATION RATE: 18 BRPM | SYSTOLIC BLOOD PRESSURE: 141 MMHG | DIASTOLIC BLOOD PRESSURE: 82 MMHG | TEMPERATURE: 98 F | OXYGEN SATURATION: 98 % | HEART RATE: 94 BPM

## 2019-10-19 PROBLEM — E11.9 TYPE 2 DIABETES MELLITUS WITHOUT COMPLICATIONS: Chronic | Status: ACTIVE | Noted: 2019-10-03

## 2019-10-19 PROBLEM — M79.7 FIBROMYALGIA: Chronic | Status: ACTIVE | Noted: 2019-10-03

## 2019-10-19 PROBLEM — I10 ESSENTIAL (PRIMARY) HYPERTENSION: Chronic | Status: ACTIVE | Noted: 2019-10-03

## 2019-10-19 LAB
ALBUMIN SERPL ELPH-MCNC: 4.1 G/DL — SIGNIFICANT CHANGE UP (ref 3.3–5)
ALP SERPL-CCNC: 94 U/L — SIGNIFICANT CHANGE UP (ref 40–120)
ALT FLD-CCNC: 52 U/L — HIGH (ref 10–45)
ANION GAP SERPL CALC-SCNC: 12 MMOL/L — SIGNIFICANT CHANGE UP (ref 5–17)
AST SERPL-CCNC: 32 U/L — SIGNIFICANT CHANGE UP (ref 10–40)
BASOPHILS # BLD AUTO: 0.02 K/UL — SIGNIFICANT CHANGE UP (ref 0–0.2)
BASOPHILS NFR BLD AUTO: 0.2 % — SIGNIFICANT CHANGE UP (ref 0–2)
BILIRUB SERPL-MCNC: 0.2 MG/DL — SIGNIFICANT CHANGE UP (ref 0.2–1.2)
BUN SERPL-MCNC: 18 MG/DL — SIGNIFICANT CHANGE UP (ref 7–23)
CALCIUM SERPL-MCNC: 9.6 MG/DL — SIGNIFICANT CHANGE UP (ref 8.4–10.5)
CHLORIDE SERPL-SCNC: 101 MMOL/L — SIGNIFICANT CHANGE UP (ref 96–108)
CO2 SERPL-SCNC: 25 MMOL/L — SIGNIFICANT CHANGE UP (ref 22–31)
CREAT SERPL-MCNC: 0.45 MG/DL — LOW (ref 0.5–1.3)
EOSINOPHIL # BLD AUTO: 0.13 K/UL — SIGNIFICANT CHANGE UP (ref 0–0.5)
EOSINOPHIL NFR BLD AUTO: 1.3 % — SIGNIFICANT CHANGE UP (ref 0–6)
GLUCOSE SERPL-MCNC: 141 MG/DL — HIGH (ref 70–99)
HCT VFR BLD CALC: 41 % — SIGNIFICANT CHANGE UP (ref 34.5–45)
HGB BLD-MCNC: 13.7 G/DL — SIGNIFICANT CHANGE UP (ref 11.5–15.5)
IMM GRANULOCYTES NFR BLD AUTO: 0.4 % — SIGNIFICANT CHANGE UP (ref 0–1.5)
LYMPHOCYTES # BLD AUTO: 2.36 K/UL — SIGNIFICANT CHANGE UP (ref 1–3.3)
LYMPHOCYTES # BLD AUTO: 24.4 % — SIGNIFICANT CHANGE UP (ref 13–44)
MCHC RBC-ENTMCNC: 30.5 PG — SIGNIFICANT CHANGE UP (ref 27–34)
MCHC RBC-ENTMCNC: 33.4 GM/DL — SIGNIFICANT CHANGE UP (ref 32–36)
MCV RBC AUTO: 91.3 FL — SIGNIFICANT CHANGE UP (ref 80–100)
MONOCYTES # BLD AUTO: 0.84 K/UL — SIGNIFICANT CHANGE UP (ref 0–0.9)
MONOCYTES NFR BLD AUTO: 8.7 % — SIGNIFICANT CHANGE UP (ref 2–14)
NEUTROPHILS # BLD AUTO: 6.28 K/UL — SIGNIFICANT CHANGE UP (ref 1.8–7.4)
NEUTROPHILS NFR BLD AUTO: 65 % — SIGNIFICANT CHANGE UP (ref 43–77)
NRBC # BLD: 0 /100 WBCS — SIGNIFICANT CHANGE UP (ref 0–0)
PLATELET # BLD AUTO: 225 K/UL — SIGNIFICANT CHANGE UP (ref 150–400)
POTASSIUM SERPL-MCNC: 4.4 MMOL/L — SIGNIFICANT CHANGE UP (ref 3.5–5.3)
POTASSIUM SERPL-SCNC: 4.4 MMOL/L — SIGNIFICANT CHANGE UP (ref 3.5–5.3)
PROT SERPL-MCNC: 7.6 G/DL — SIGNIFICANT CHANGE UP (ref 6–8.3)
RBC # BLD: 4.49 M/UL — SIGNIFICANT CHANGE UP (ref 3.8–5.2)
RBC # FLD: 11.9 % — SIGNIFICANT CHANGE UP (ref 10.3–14.5)
SODIUM SERPL-SCNC: 138 MMOL/L — SIGNIFICANT CHANGE UP (ref 135–145)
TROPONIN T, HIGH SENSITIVITY RESULT: <6 NG/L — SIGNIFICANT CHANGE UP (ref 0–51)
WBC # BLD: 9.67 K/UL — SIGNIFICANT CHANGE UP (ref 3.8–10.5)
WBC # FLD AUTO: 9.67 K/UL — SIGNIFICANT CHANGE UP (ref 3.8–10.5)

## 2019-10-19 PROCEDURE — 99283 EMERGENCY DEPT VISIT LOW MDM: CPT

## 2019-10-19 PROCEDURE — 80053 COMPREHEN METABOLIC PANEL: CPT

## 2019-10-19 PROCEDURE — 84484 ASSAY OF TROPONIN QUANT: CPT

## 2019-10-19 PROCEDURE — 93010 ELECTROCARDIOGRAM REPORT: CPT

## 2019-10-19 PROCEDURE — 99284 EMERGENCY DEPT VISIT MOD MDM: CPT

## 2019-10-19 PROCEDURE — 93005 ELECTROCARDIOGRAM TRACING: CPT

## 2019-10-19 PROCEDURE — 85027 COMPLETE CBC AUTOMATED: CPT

## 2019-10-19 RX ORDER — ACETAMINOPHEN 500 MG
650 TABLET ORAL ONCE
Refills: 0 | Status: COMPLETED | OUTPATIENT
Start: 2019-10-19 | End: 2019-10-19

## 2019-10-19 RX ORDER — LIDOCAINE 4 G/100G
1 CREAM TOPICAL ONCE
Refills: 0 | Status: COMPLETED | OUTPATIENT
Start: 2019-10-19 | End: 2019-10-19

## 2019-10-19 RX ORDER — DIAZEPAM 5 MG
5 TABLET ORAL ONCE
Refills: 0 | Status: DISCONTINUED | OUTPATIENT
Start: 2019-10-19 | End: 2019-10-19

## 2019-10-19 RX ADMIN — Medication 5 MILLIGRAM(S): at 16:54

## 2019-10-19 RX ADMIN — Medication 650 MILLIGRAM(S): at 13:42

## 2019-10-19 NOTE — ED ADULT NURSE REASSESSMENT NOTE - NS ED NURSE REASSESS COMMENT FT1
pt refused valium. pt states she "will check with friend who is a pharmacist to see if she should take it". upon discharge, pt states she has CP, worse with cough. MD Stacy made aware. repeat EKG done.

## 2019-10-19 NOTE — ED PROVIDER NOTE - ATTENDING CONTRIBUTION TO CARE
Attending MD Smith.  Agree with above.  Pt is a 53 yo female with pmhx of CAD s/p stent on 10/5 on ASA/plavix, DM, HTN, fibromyalgia who presents to ED with L shoulder pain localizing to L trapezius.  Pt endorses onset of pain last night after she spent the day cleaning her house.  Pain is markedly focused to L mid-trapezius with palpable muscle spasm, palpation of which reproduces pt pain.  Pt unable to range LUE at L shoulder 2/2 pain.  NVSC intact distal to site.  Pt holding L shoulder in abnormally tensed position.  EKG/labs non-actionable and obtained as screening only 2/2 age/risk factors.  No assoc SOB.  No fall/injury noted but pt states she was cleaning and lifted several items above her head.  Pt offered trigger point injection for comfort but refusing.  WIll attempt muscle relaxer and dc to follow-up as outpt.  Pt comfortable with this plan.  Stable at time of signout pending muscle relaxer and reassessment.

## 2019-10-19 NOTE — ED PROVIDER NOTE - MUSCULOSKELETAL, MLM
Spine appears normal, range of motion is not limited. +reproducible supraspinatous point tenderness with palpation. +boggy muscle belly. Also reporducible with yergasons test. No obvious crepitus/joint swelling

## 2019-10-19 NOTE — ED PROVIDER NOTE - NSFOLLOWUPCLINICS_GEN_ALL_ED_FT
[facility] Sports Medicine  Sports Medicine  [address] [place] [address]  Phone: [phone]  Fax:   Follow Up Time:

## 2019-10-19 NOTE — ED PROVIDER NOTE - PATIENT PORTAL LINK FT
You can access the FollowMyHealth Patient Portal offered by [facility] by registering at the following website: [email or web address]. By joining [employer]’s FollowMyHealth portal, you will also be able to view your health information using other applications (apps) compatible with our system.

## 2019-10-19 NOTE — ED PROVIDER NOTE - PROGRESS NOTE DETAILS
AG Pgy3: Patient likely has trapezius spasm. Offered Trigger point injection but patient declined. Discussed lab results. Patient feels improved. Well appearing and VSS. Pt will be d/c'd with strict return precautions and close f/u with md. Pt verbalized understanding and states they want to leave, ready for d/c. CAROL Pgy3: upon discharge, patient states she has chest pain with a dry cough that she has had for the past few days. Also reports post nasal drip in the same time period. Patient still comfortable/well appearing and VSS. no change to PE. discussed that she likely has a viral infection and important signs/symptoms to be aware of. Discussed strict return precautions. Patient is scheduled to f/u with her cardiologist on Monday morning. repeat EKG WNL and unchanged. Will d/c home.

## 2019-10-19 NOTE — ED ADULT NURSE NOTE - OBJECTIVE STATEMENT
55 yo presents to the ED from home. A&Ox4, ambulatory c/o L shoulder pain. recent cardiac stent, on Plavix. denies any CP. reports side of neck pain with top of shoulder pain. denies back of shoulder pain. denies any SOB.

## 2019-10-19 NOTE — ED PROVIDER NOTE - CLINICAL SUMMARY MEDICAL DECISION MAKING FREE TEXT BOX
likely musculoskeletal given h&p. will get labs/ekg given recent stent, but low suspicion for ACS. Also low suspicion for joint involvement. no evidence of infection. well appearing and vss. labs, analgese, reassess.

## 2019-10-31 PROCEDURE — 84295 ASSAY OF SERUM SODIUM: CPT

## 2019-10-31 PROCEDURE — 84132 ASSAY OF SERUM POTASSIUM: CPT

## 2019-10-31 PROCEDURE — 80048 BASIC METABOLIC PNL TOTAL CA: CPT

## 2019-10-31 PROCEDURE — 85014 HEMATOCRIT: CPT

## 2019-10-31 PROCEDURE — 82803 BLOOD GASES ANY COMBINATION: CPT

## 2019-10-31 PROCEDURE — C1887: CPT

## 2019-10-31 PROCEDURE — 82550 ASSAY OF CK (CPK): CPT

## 2019-10-31 PROCEDURE — 80053 COMPREHEN METABOLIC PANEL: CPT

## 2019-10-31 PROCEDURE — 93454 CORONARY ARTERY ANGIO S&I: CPT | Mod: 59

## 2019-10-31 PROCEDURE — 80061 LIPID PANEL: CPT

## 2019-10-31 PROCEDURE — 84443 ASSAY THYROID STIM HORMONE: CPT

## 2019-10-31 PROCEDURE — C1894: CPT

## 2019-10-31 PROCEDURE — 84484 ASSAY OF TROPONIN QUANT: CPT

## 2019-10-31 PROCEDURE — 82962 GLUCOSE BLOOD TEST: CPT

## 2019-10-31 PROCEDURE — 82947 ASSAY GLUCOSE BLOOD QUANT: CPT

## 2019-10-31 PROCEDURE — 86803 HEPATITIS C AB TEST: CPT

## 2019-10-31 PROCEDURE — 85027 COMPLETE CBC AUTOMATED: CPT

## 2019-10-31 PROCEDURE — 71046 X-RAY EXAM CHEST 2 VIEWS: CPT

## 2019-10-31 PROCEDURE — 83036 HEMOGLOBIN GLYCOSYLATED A1C: CPT

## 2019-10-31 PROCEDURE — 82553 CREATINE MB FRACTION: CPT

## 2019-10-31 PROCEDURE — 99285 EMERGENCY DEPT VISIT HI MDM: CPT

## 2019-10-31 PROCEDURE — 81001 URINALYSIS AUTO W/SCOPE: CPT

## 2019-10-31 PROCEDURE — 82330 ASSAY OF CALCIUM: CPT

## 2019-10-31 PROCEDURE — C9600: CPT | Mod: LD

## 2019-10-31 PROCEDURE — 82435 ASSAY OF BLOOD CHLORIDE: CPT

## 2019-10-31 PROCEDURE — C1874: CPT

## 2019-10-31 PROCEDURE — C1769: CPT

## 2019-10-31 PROCEDURE — 93005 ELECTROCARDIOGRAM TRACING: CPT

## 2019-10-31 PROCEDURE — 83605 ASSAY OF LACTIC ACID: CPT

## 2019-11-01 ENCOUNTER — OUTPATIENT (OUTPATIENT)
Dept: OUTPATIENT SERVICES | Facility: HOSPITAL | Age: 54
LOS: 1 days | End: 2019-11-01
Payer: MEDICAID

## 2019-11-01 PROCEDURE — G9001: CPT

## 2019-11-15 DIAGNOSIS — Z71.89 OTHER SPECIFIED COUNSELING: ICD-10-CM

## 2019-11-25 NOTE — PROVIDER CONTACT NOTE (MEDICATION) - DATE AND TIME:
Pelvic floor incontinence therapy, see if any one does this locally  eliquis twice daily, no aspirin  ZIO patch and echocardiogram will be ordered and go from there   04-Oct-2019 20:15

## 2020-10-01 ENCOUNTER — OUTPATIENT (OUTPATIENT)
Dept: OUTPATIENT SERVICES | Facility: HOSPITAL | Age: 55
LOS: 1 days | End: 2020-10-01
Payer: MEDICAID

## 2020-10-07 ENCOUNTER — EMERGENCY (EMERGENCY)
Facility: HOSPITAL | Age: 55
LOS: 1 days | Discharge: HOME | End: 2020-10-07
Attending: EMERGENCY MEDICINE
Payer: MEDICAID

## 2020-10-07 VITALS
OXYGEN SATURATION: 99 % | HEART RATE: 88 BPM | WEIGHT: 229.28 LBS | DIASTOLIC BLOOD PRESSURE: 75 MMHG | HEIGHT: 69 IN | TEMPERATURE: 98 F | SYSTOLIC BLOOD PRESSURE: 178 MMHG | RESPIRATION RATE: 18 BRPM

## 2020-10-07 VITALS
SYSTOLIC BLOOD PRESSURE: 148 MMHG | DIASTOLIC BLOOD PRESSURE: 77 MMHG | RESPIRATION RATE: 18 BRPM | OXYGEN SATURATION: 95 % | TEMPERATURE: 98 F | HEART RATE: 80 BPM

## 2020-10-07 LAB
ALBUMIN SERPL ELPH-MCNC: 3.6 G/DL — SIGNIFICANT CHANGE UP (ref 3.5–5)
ALP SERPL-CCNC: 100 U/L — SIGNIFICANT CHANGE UP (ref 40–120)
ALT FLD-CCNC: 49 U/L DA — SIGNIFICANT CHANGE UP (ref 10–60)
ANION GAP SERPL CALC-SCNC: 3 MMOL/L — LOW (ref 5–17)
AST SERPL-CCNC: 18 U/L — SIGNIFICANT CHANGE UP (ref 10–40)
BASOPHILS # BLD AUTO: 0.02 K/UL — SIGNIFICANT CHANGE UP (ref 0–0.2)
BASOPHILS NFR BLD AUTO: 0.3 % — SIGNIFICANT CHANGE UP (ref 0–2)
BILIRUB SERPL-MCNC: 0.3 MG/DL — SIGNIFICANT CHANGE UP (ref 0.2–1.2)
BUN SERPL-MCNC: 17 MG/DL — SIGNIFICANT CHANGE UP (ref 7–18)
CALCIUM SERPL-MCNC: 10.1 MG/DL — SIGNIFICANT CHANGE UP (ref 8.4–10.5)
CHLORIDE SERPL-SCNC: 105 MMOL/L — SIGNIFICANT CHANGE UP (ref 96–108)
CK SERPL-CCNC: 62 U/L — SIGNIFICANT CHANGE UP (ref 21–215)
CO2 SERPL-SCNC: 29 MMOL/L — SIGNIFICANT CHANGE UP (ref 22–31)
CREAT SERPL-MCNC: 0.67 MG/DL — SIGNIFICANT CHANGE UP (ref 0.5–1.3)
D DIMER BLD IA.RAPID-MCNC: 166 NG/ML DDU — SIGNIFICANT CHANGE UP
EOSINOPHIL # BLD AUTO: 0.12 K/UL — SIGNIFICANT CHANGE UP (ref 0–0.5)
EOSINOPHIL NFR BLD AUTO: 1.8 % — SIGNIFICANT CHANGE UP (ref 0–6)
GLUCOSE SERPL-MCNC: 175 MG/DL — HIGH (ref 70–99)
HCT VFR BLD CALC: 42.6 % — SIGNIFICANT CHANGE UP (ref 34.5–45)
HGB BLD-MCNC: 14.4 G/DL — SIGNIFICANT CHANGE UP (ref 11.5–15.5)
IMM GRANULOCYTES NFR BLD AUTO: 0.1 % — SIGNIFICANT CHANGE UP (ref 0–1.5)
LYMPHOCYTES # BLD AUTO: 2.25 K/UL — SIGNIFICANT CHANGE UP (ref 1–3.3)
LYMPHOCYTES # BLD AUTO: 33.3 % — SIGNIFICANT CHANGE UP (ref 13–44)
MCHC RBC-ENTMCNC: 31 PG — SIGNIFICANT CHANGE UP (ref 27–34)
MCHC RBC-ENTMCNC: 33.8 GM/DL — SIGNIFICANT CHANGE UP (ref 32–36)
MCV RBC AUTO: 91.8 FL — SIGNIFICANT CHANGE UP (ref 80–100)
MONOCYTES # BLD AUTO: 0.56 K/UL — SIGNIFICANT CHANGE UP (ref 0–0.9)
MONOCYTES NFR BLD AUTO: 8.3 % — SIGNIFICANT CHANGE UP (ref 2–14)
NEUTROPHILS # BLD AUTO: 3.8 K/UL — SIGNIFICANT CHANGE UP (ref 1.8–7.4)
NEUTROPHILS NFR BLD AUTO: 56.2 % — SIGNIFICANT CHANGE UP (ref 43–77)
NRBC # BLD: 0 /100 WBCS — SIGNIFICANT CHANGE UP (ref 0–0)
NT-PROBNP SERPL-SCNC: 9 PG/ML — SIGNIFICANT CHANGE UP (ref 0–125)
PLATELET # BLD AUTO: 202 K/UL — SIGNIFICANT CHANGE UP (ref 150–400)
POTASSIUM SERPL-MCNC: 4.5 MMOL/L — SIGNIFICANT CHANGE UP (ref 3.5–5.3)
POTASSIUM SERPL-SCNC: 4.5 MMOL/L — SIGNIFICANT CHANGE UP (ref 3.5–5.3)
PROT SERPL-MCNC: 7.8 G/DL — SIGNIFICANT CHANGE UP (ref 6–8.3)
RBC # BLD: 4.64 M/UL — SIGNIFICANT CHANGE UP (ref 3.8–5.2)
RBC # FLD: 12.4 % — SIGNIFICANT CHANGE UP (ref 10.3–14.5)
SODIUM SERPL-SCNC: 137 MMOL/L — SIGNIFICANT CHANGE UP (ref 135–145)
TROPONIN I SERPL-MCNC: <0.015 NG/ML — SIGNIFICANT CHANGE UP (ref 0–0.04)
WBC # BLD: 6.76 K/UL — SIGNIFICANT CHANGE UP (ref 3.8–10.5)
WBC # FLD AUTO: 6.76 K/UL — SIGNIFICANT CHANGE UP (ref 3.8–10.5)

## 2020-10-07 PROCEDURE — 85379 FIBRIN DEGRADATION QUANT: CPT

## 2020-10-07 PROCEDURE — 99285 EMERGENCY DEPT VISIT HI MDM: CPT

## 2020-10-07 PROCEDURE — 36415 COLL VENOUS BLD VENIPUNCTURE: CPT

## 2020-10-07 PROCEDURE — 71046 X-RAY EXAM CHEST 2 VIEWS: CPT | Mod: 26

## 2020-10-07 PROCEDURE — 82550 ASSAY OF CK (CPK): CPT

## 2020-10-07 PROCEDURE — 83880 ASSAY OF NATRIURETIC PEPTIDE: CPT

## 2020-10-07 PROCEDURE — 80053 COMPREHEN METABOLIC PANEL: CPT

## 2020-10-07 PROCEDURE — 85025 COMPLETE CBC W/AUTO DIFF WBC: CPT

## 2020-10-07 PROCEDURE — 93005 ELECTROCARDIOGRAM TRACING: CPT

## 2020-10-07 PROCEDURE — 84484 ASSAY OF TROPONIN QUANT: CPT

## 2020-10-07 PROCEDURE — 99283 EMERGENCY DEPT VISIT LOW MDM: CPT | Mod: 25

## 2020-10-07 PROCEDURE — 71046 X-RAY EXAM CHEST 2 VIEWS: CPT

## 2020-10-07 NOTE — ED ADULT NURSE NOTE - NSIMPLEMENTINTERV_GEN_ALL_ED
Implemented All Universal Safety Interventions:  [place] to call system. Call bell, personal items and telephone within reach. Instruct patient to call for assistance. Room bathroom lighting operational. Non-slip footwear when patient is off stretcher. Physically safe environment: no spills, clutter or unnecessary equipment. Stretcher in lowest position, wheels locked, appropriate side rails in place.

## 2020-10-07 NOTE — ED PROVIDER NOTE - QUALITY
Problem: Diabetes/Glucose Control  Goal: Glucose maintained within prescribed range  Description  INTERVENTIONS:  - Monitor Blood Glucose as ordered  - Assess for signs and symptoms of hyperglycemia and hypoglycemia  - Administer ordered medications to m alteration in breathing pattern

## 2020-10-07 NOTE — ED ADULT NURSE NOTE - OBJECTIVE STATEMENT
Patient presents to ED with c/o pressure 8/10 starting at shoulders radiating to chest, with most pressure left lateral chest. Patient unable to describe pain. Associated with difficulty taking deep breaths. Denies nausea, vomiting, dizziness. As per patient she started taking zpac on Sunday after visit to urgent care.

## 2020-10-07 NOTE — ED ADULT NURSE NOTE - ED STAT RN HANDOFF DETAILS
Patient discharged home. Pain has improved, no difficulty breathing noted. Patient stable and in no acute distress.

## 2020-10-07 NOTE — ED PROVIDER NOTE - NSFOLLOWUPINSTRUCTIONS_ED_ALL_ED_FT
Dyspnea    WHAT YOU NEED TO KNOW:    What is dyspnea? Dyspnea is breathing difficulty or discomfort. You may have labored, painful, or shallow breathing. You may feel breathless or short of breath. Dyspnea can occur during rest or with activity. You may have dyspnea for a short time, or it might become chronic. Dyspnea is often a symptom of a disease or condition.    What signs and symptoms can occur with dyspnea?   •Chest tightness or pain      •A cough, or a coarse or high-pitched noise when you breathe      •Pale and sweaty, cool skin      •Confusion and tiredness      •Bluish-gray lips or nails      What increases my risk for dyspnea?   •Swelling in the throat from an infection or allergic reaction      •Lung conditions such as asthma, COPD, cancer, infection, or a blood clot      •Heart conditions such as abnormal heartbeats, heart failure, or coronary artery disease      •Smoking, exposure to chemicals such as carbon monoxide, or too much aspirin      •A condition that affects your central nervous system, such as a spinal cord injury or nerve damage      •Enlarged abdomen because you are overweight, pregnant, or have ascites (fluid in the abdomen) from liver disease       •Anemia (low red blood cell count), anxiety, panic, or going to a high altitude       How is the cause of dyspnea diagnosed? Your healthcare provider may ask when your dyspnea began and what you were doing. Tell him or her how often you have dyspnea and what makes it worse or better. Tell your healthcare provider about medicines you take. Describe any other symptoms, such as pain or a fever. Your healthcare provider will listen to you breathe and watch for irregular breathing. You may also need the following:   •A pulse oximeter is a device that measures the amount of oxygen in your blood. A cord with a clip or sticky strip is placed on your finger, ear, or toe. The other end of the cord is hooked to a machine.       •Blood tests may show your healthcare provider if you are at risk for blood clots or heart failure. Blood tests can also show if you have anemia or an infection.       •X-ray pictures may show signs of infection or fluid around your heart or lungs.       •Exercise tests help your healthcare provider learn if you have symptoms, along with dyspnea, that limit activity. Symptoms include leg pain, fatigue, and weakness. Exercise tests can also show if your dyspnea is caused by heart problems.       •CT scan pictures may show blood clots or an area of disease in your lungs. You may be given contract liquid to help your lungs show up better in the pictures. Tell the healthcare provider if you have ever had an allergic reaction to contrast liquid.       •An echocardiogram is a type of ultrasound. Sound waves are used to show the structure and function of your heart.      •An EKG is a test that measures the electrical activity of your heart.      How is dyspnea treated? You may need treatment if your symptoms prevent you from doing your daily activities. The condition causing your dyspnea may need to be treated. You may also need the following to improve your symptoms:   •Oxygen therapy may be used to help you breathe easier. You may need oxygen if your blood oxygen level is lower than it should be.       •Medicines may be used to treat the cause of your dyspnea. Medicines may reduce swelling in your airway or decrease extra fluid from around your heart or lungs. Other medicines may be used to decrease anxiety and help you feel calm and relaxed.      •Pulmonary rehabilitation is used to reduce your symptoms while keeping you active. You may learn breathing techniques, muscle strengthening, and how to pace yourself when you are active.       How can I manage long-term dyspnea?   •Create an action plan. You and your healthcare provider can work together to create a plan for how to handle episodes of dyspnea. The plan can include daily activities, treatment changes, and what to do if you have severe breathing problems.      •Lean forward on your elbows when you sit. This helps your lungs expand and may make it easier to breathe.      •Use pursed-lip breathing any time you feel short of breath. Breathe in through your nose and then slowly breathe out through your mouth with your lips slightly puckered. It should take you twice as long to breathe out as it did to breathe in.  Breathe in Breathe out           •Do not smoke. Nicotine and other chemicals in cigarettes and cigars can cause lung damage and make it harder to breathe. Ask your healthcare provider for information if you currently smoke and need help to quit. E-cigarettes or smokeless tobacco still contain nicotine. Talk to your healthcare provider before you use these products.       •Reach or maintain a healthy weight. Your healthcare provider can help you create a safe weight loss plan if you are overweight.      •Exercise as directed. Exercise can help your lungs work more easily. Exercise can also help you lose weight if needed. Try to get at least 30 minutes of exercise most days of the week. Your healthcare provider can help you create an exercise plan that is safe for you.      When should I seek immediate care?   •Your signs and symptoms are the same or worse within 24 hours of treatment.       •You have shaking chills or a fever over 102°F.       •You have new pain, pressure, or tightness in your chest.       •You have a new or worse cough or wheezing, or you cough up blood.      •You feel like you cannot get enough air.      •The skin over your ribs or on your neck sinks in when you breathe.       •You have a severe headache with vomiting and abdominal pain.       •You feel confused or dizzy.      When should I contact my healthcare provider?   •You have questions or concerns about your condition or care.          CARE AGREEMENT:    You have the right to help plan your care. Learn about your health condition and how it may be treated. Discuss treatment options with your healthcare providers to decide what care you want to receive. You always have the right to refuse treatment.

## 2020-10-07 NOTE — ED PROVIDER NOTE - PATIENT PORTAL LINK FT
You can access the FollowMyHealth Patient Portal offered by [facility] by registering at the following website: [email or web address]. By joining [employer]’s FollowMyHealth portal, you will also be able to view your health information using other applications (apps) compatible with our system.

## 2020-10-07 NOTE — ED PROVIDER NOTE - OBJECTIVE STATEMENT
56 y/o female with PMHx of cardiac stents, DM, HTN presents to the ED c/o shortness of breath x 5 days. Pt notes associated back pain radiating to L chest. Pt was seen at  x 5 days ago, had negative COVID swab and was given Z-Obi which she has taken to no relief. Pt notes constant Sx. Pt denies fever, chills, or any other complaints. Pt on Plavix. Pt allergic to Cipro (SOB).

## 2020-10-09 DIAGNOSIS — Z71.89 OTHER SPECIFIED COUNSELING: ICD-10-CM

## 2021-01-01 ENCOUNTER — EMERGENCY (EMERGENCY)
Facility: HOSPITAL | Age: 56
LOS: 1 days | Discharge: HOME | End: 2021-01-01
Attending: STUDENT IN AN ORGANIZED HEALTH CARE EDUCATION/TRAINING PROGRAM
Payer: MEDICAID

## 2021-01-01 VITALS
HEIGHT: 69 IN | WEIGHT: 220.02 LBS | OXYGEN SATURATION: 100 % | RESPIRATION RATE: 18 BRPM | DIASTOLIC BLOOD PRESSURE: 78 MMHG | HEART RATE: 97 BPM | TEMPERATURE: 98 F | SYSTOLIC BLOOD PRESSURE: 136 MMHG

## 2021-01-01 VITALS
HEART RATE: 75 BPM | TEMPERATURE: 97 F | DIASTOLIC BLOOD PRESSURE: 75 MMHG | OXYGEN SATURATION: 98 % | SYSTOLIC BLOOD PRESSURE: 125 MMHG | RESPIRATION RATE: 18 BRPM

## 2021-01-01 LAB
ALBUMIN SERPL ELPH-MCNC: 3.4 G/DL — LOW (ref 3.5–5)
ALP SERPL-CCNC: 112 U/L — SIGNIFICANT CHANGE UP (ref 40–120)
ALT FLD-CCNC: 42 U/L DA — SIGNIFICANT CHANGE UP (ref 10–60)
ANION GAP SERPL CALC-SCNC: 7 MMOL/L — SIGNIFICANT CHANGE UP (ref 5–17)
APPEARANCE UR: CLEAR — SIGNIFICANT CHANGE UP
AST SERPL-CCNC: 15 U/L — SIGNIFICANT CHANGE UP (ref 10–40)
BACTERIA # UR AUTO: ABNORMAL /HPF
BASOPHILS # BLD AUTO: 0.03 K/UL — SIGNIFICANT CHANGE UP (ref 0–0.2)
BASOPHILS NFR BLD AUTO: 0.3 % — SIGNIFICANT CHANGE UP (ref 0–2)
BILIRUB SERPL-MCNC: 0.2 MG/DL — SIGNIFICANT CHANGE UP (ref 0.2–1.2)
BILIRUB UR-MCNC: NEGATIVE — SIGNIFICANT CHANGE UP
BUN SERPL-MCNC: 26 MG/DL — HIGH (ref 7–18)
CALCIUM SERPL-MCNC: 9.7 MG/DL — SIGNIFICANT CHANGE UP (ref 8.4–10.5)
CHLORIDE SERPL-SCNC: 104 MMOL/L — SIGNIFICANT CHANGE UP (ref 96–108)
CK SERPL-CCNC: 87 U/L — SIGNIFICANT CHANGE UP (ref 21–215)
CO2 SERPL-SCNC: 27 MMOL/L — SIGNIFICANT CHANGE UP (ref 22–31)
COLOR SPEC: YELLOW — SIGNIFICANT CHANGE UP
CREAT SERPL-MCNC: 0.65 MG/DL — SIGNIFICANT CHANGE UP (ref 0.5–1.3)
DIFF PNL FLD: ABNORMAL
EOSINOPHIL # BLD AUTO: 0.13 K/UL — SIGNIFICANT CHANGE UP (ref 0–0.5)
EOSINOPHIL NFR BLD AUTO: 1.5 % — SIGNIFICANT CHANGE UP (ref 0–6)
EPI CELLS # UR: SIGNIFICANT CHANGE UP /HPF
GLUCOSE SERPL-MCNC: 189 MG/DL — HIGH (ref 70–99)
GLUCOSE UR QL: 250
HCT VFR BLD CALC: 40.2 % — SIGNIFICANT CHANGE UP (ref 34.5–45)
HGB BLD-MCNC: 13 G/DL — SIGNIFICANT CHANGE UP (ref 11.5–15.5)
HYALINE CASTS # UR AUTO: ABNORMAL /LPF
IMM GRANULOCYTES NFR BLD AUTO: 0.1 % — SIGNIFICANT CHANGE UP (ref 0–1.5)
KETONES UR-MCNC: ABNORMAL
LEUKOCYTE ESTERASE UR-ACNC: ABNORMAL
LYMPHOCYTES # BLD AUTO: 2.69 K/UL — SIGNIFICANT CHANGE UP (ref 1–3.3)
LYMPHOCYTES # BLD AUTO: 31.3 % — SIGNIFICANT CHANGE UP (ref 13–44)
MCHC RBC-ENTMCNC: 30.4 PG — SIGNIFICANT CHANGE UP (ref 27–34)
MCHC RBC-ENTMCNC: 32.3 GM/DL — SIGNIFICANT CHANGE UP (ref 32–36)
MCV RBC AUTO: 93.9 FL — SIGNIFICANT CHANGE UP (ref 80–100)
MONOCYTES # BLD AUTO: 0.8 K/UL — SIGNIFICANT CHANGE UP (ref 0–0.9)
MONOCYTES NFR BLD AUTO: 9.3 % — SIGNIFICANT CHANGE UP (ref 2–14)
NEUTROPHILS # BLD AUTO: 4.93 K/UL — SIGNIFICANT CHANGE UP (ref 1.8–7.4)
NEUTROPHILS NFR BLD AUTO: 57.5 % — SIGNIFICANT CHANGE UP (ref 43–77)
NITRITE UR-MCNC: NEGATIVE — SIGNIFICANT CHANGE UP
NRBC # BLD: 0 /100 WBCS — SIGNIFICANT CHANGE UP (ref 0–0)
PH UR: 5 — SIGNIFICANT CHANGE UP (ref 5–8)
PLATELET # BLD AUTO: 209 K/UL — SIGNIFICANT CHANGE UP (ref 150–400)
POTASSIUM SERPL-MCNC: 4.1 MMOL/L — SIGNIFICANT CHANGE UP (ref 3.5–5.3)
POTASSIUM SERPL-SCNC: 4.1 MMOL/L — SIGNIFICANT CHANGE UP (ref 3.5–5.3)
PROT SERPL-MCNC: 7.3 G/DL — SIGNIFICANT CHANGE UP (ref 6–8.3)
PROT UR-MCNC: NEGATIVE — SIGNIFICANT CHANGE UP
RBC # BLD: 4.28 M/UL — SIGNIFICANT CHANGE UP (ref 3.8–5.2)
RBC # FLD: 12.2 % — SIGNIFICANT CHANGE UP (ref 10.3–14.5)
RBC CASTS # UR COMP ASSIST: ABNORMAL /HPF (ref 0–2)
SODIUM SERPL-SCNC: 138 MMOL/L — SIGNIFICANT CHANGE UP (ref 135–145)
SP GR SPEC: 1.02 — SIGNIFICANT CHANGE UP (ref 1.01–1.02)
UROBILINOGEN FLD QL: NEGATIVE — SIGNIFICANT CHANGE UP
WBC # BLD: 8.59 K/UL — SIGNIFICANT CHANGE UP (ref 3.8–10.5)
WBC # FLD AUTO: 8.59 K/UL — SIGNIFICANT CHANGE UP (ref 3.8–10.5)
WBC UR QL: ABNORMAL /HPF (ref 0–5)

## 2021-01-01 PROCEDURE — 81001 URINALYSIS AUTO W/SCOPE: CPT

## 2021-01-01 PROCEDURE — 99284 EMERGENCY DEPT VISIT MOD MDM: CPT | Mod: 25

## 2021-01-01 PROCEDURE — 96374 THER/PROPH/DIAG INJ IV PUSH: CPT

## 2021-01-01 PROCEDURE — 96361 HYDRATE IV INFUSION ADD-ON: CPT

## 2021-01-01 PROCEDURE — 99284 EMERGENCY DEPT VISIT MOD MDM: CPT

## 2021-01-01 PROCEDURE — 82550 ASSAY OF CK (CPK): CPT

## 2021-01-01 PROCEDURE — 80053 COMPREHEN METABOLIC PANEL: CPT

## 2021-01-01 PROCEDURE — 85025 COMPLETE CBC W/AUTO DIFF WBC: CPT

## 2021-01-01 PROCEDURE — 36415 COLL VENOUS BLD VENIPUNCTURE: CPT

## 2021-01-01 PROCEDURE — 96375 TX/PRO/DX INJ NEW DRUG ADDON: CPT

## 2021-01-01 RX ORDER — KETOROLAC TROMETHAMINE 30 MG/ML
15 SYRINGE (ML) INJECTION ONCE
Refills: 0 | Status: DISCONTINUED | OUTPATIENT
Start: 2021-01-01 | End: 2021-01-01

## 2021-01-01 RX ORDER — CEFTRIAXONE 500 MG/1
1000 INJECTION, POWDER, FOR SOLUTION INTRAMUSCULAR; INTRAVENOUS ONCE
Refills: 0 | Status: COMPLETED | OUTPATIENT
Start: 2021-01-01 | End: 2021-01-01

## 2021-01-01 RX ORDER — DIAZEPAM 5 MG
5 TABLET ORAL ONCE
Refills: 0 | Status: DISCONTINUED | OUTPATIENT
Start: 2021-01-01 | End: 2021-01-01

## 2021-01-01 RX ORDER — SODIUM CHLORIDE 9 MG/ML
1000 INJECTION INTRAMUSCULAR; INTRAVENOUS; SUBCUTANEOUS ONCE
Refills: 0 | Status: COMPLETED | OUTPATIENT
Start: 2021-01-01 | End: 2021-01-01

## 2021-01-01 RX ORDER — NITROFURANTOIN MACROCRYSTAL 50 MG
1 CAPSULE ORAL
Qty: 10 | Refills: 0
Start: 2021-01-01 | End: 2021-01-05

## 2021-01-01 RX ORDER — SODIUM CHLORIDE 9 MG/ML
3 INJECTION INTRAMUSCULAR; INTRAVENOUS; SUBCUTANEOUS EVERY 8 HOURS
Refills: 0 | Status: DISCONTINUED | OUTPATIENT
Start: 2021-01-01 | End: 2021-01-04

## 2021-01-01 RX ADMIN — SODIUM CHLORIDE 1000 MILLILITER(S): 9 INJECTION INTRAMUSCULAR; INTRAVENOUS; SUBCUTANEOUS at 03:56

## 2021-01-01 RX ADMIN — SODIUM CHLORIDE 1000 MILLILITER(S): 9 INJECTION INTRAMUSCULAR; INTRAVENOUS; SUBCUTANEOUS at 05:53

## 2021-01-01 RX ADMIN — Medication 5 MILLIGRAM(S): at 03:55

## 2021-01-01 RX ADMIN — CEFTRIAXONE 1000 MILLIGRAM(S): 500 INJECTION, POWDER, FOR SOLUTION INTRAMUSCULAR; INTRAVENOUS at 05:53

## 2021-01-01 RX ADMIN — Medication 15 MILLIGRAM(S): at 03:55

## 2021-01-01 RX ADMIN — Medication 15 MILLIGRAM(S): at 03:57

## 2021-01-01 RX ADMIN — CEFTRIAXONE 100 MILLIGRAM(S): 500 INJECTION, POWDER, FOR SOLUTION INTRAMUSCULAR; INTRAVENOUS at 05:50

## 2021-01-01 RX ADMIN — SODIUM CHLORIDE 3 MILLILITER(S): 9 INJECTION INTRAMUSCULAR; INTRAVENOUS; SUBCUTANEOUS at 05:22

## 2021-01-01 NOTE — ED PROVIDER NOTE - OBJECTIVE STATEMENT
56 yo F h/o CAD with stents, IDDM, HTN, fibromyalgia p/w bilat groin and bilat anterior thigh pain x several hours. Pt states that she was feeling well and then this pain radiating down the anterior thighs started and she's had difficulty ambulating as a result of the pain. Pt took no meds PTA to the ED. Pt denies fevers/chills, skin changes, ABD pain, dysuria/urinary symptoms, saddle anes, urinary or bowel incontinence, focal numbness/weakness, chest pain, SOB, syncope. No Trauma/Hx Spinal Surgeries.

## 2021-01-01 NOTE — ED PROVIDER NOTE - CLINICAL SUMMARY MEDICAL DECISION MAKING FREE TEXT BOX
Pt p/w bilat pain rad from groin to infero-anterior thighs and worse with mvmt. During exam pt was very quick to move leg and stood up with no difficulty, pt then refused to walk stating that she was in too much pain. Must r/o electrolyte derangment, rhabdo, among other dx. Labs pending. Giving meds for muscle spasm. Pt stable. Will reassess. Pt p/w bilat pain rad from groin to infero-anterior thighs and worse with mvmt. During exam pt was very quick to move leg and stood up with no difficulty, pt then refused to walk stating that she was in too much pain. Must r/o electrolyte derangment, rhabdo, among other dx. Labs pending. Giving meds for muscle spasm. Pt stable. Will reassess.    Labs unremarkable and UA showing UTI. Pt's thigh and groin pain significantly improved s/p meds in the ED and pt given first dose ABx here. Most likely symptoms 2/2 uncomplicated cystitis, details of case, history, and exam making a more emergent diagnosis much less likely. Discussed with pt my clinical impression and results, patient given strict return precautions if persistent or worsening of symptoms occurs, and need for close follow up. Pt well appearing with a reassuring exam. Pt expressed understanding and agrees with plan. Discharge home with PMD or Specialist f/u within 5 days.

## 2021-01-01 NOTE — ED PROVIDER NOTE - PHYSICAL EXAMINATION
Vital Signs Reviewed  GEN: Comfortable, NAD, AAOx3  HEENT: NCAT, EOMI, MMM, Neck Supple  RESP: CTAB, No rales/rhonchi/wheezing  CV: RRR, S1S2, No murmurs  ABD: No TTP, ND, No masses, No CVA Tenderness  Extrem/Skin: Equal pulses bilat, No cyanosis/edema/rashes  Neuro: Able to bear weight on both legs with no issue, reluctant to walk 2/2 pain, bilat equal strength and sensation in LEs

## 2021-01-01 NOTE — ED ADULT NURSE NOTE - NSIMPLEMENTINTERV_GEN_ALL_ED
Implemented All Fall Risk Interventions:  [place] to call system. Call bell, personal items and telephone within reach. Instruct patient to call for assistance. Room bathroom lighting operational. Non-slip footwear when patient is off stretcher. Physically safe environment: no spills, clutter or unnecessary equipment. Stretcher in lowest position, wheels locked, appropriate side rails in place. Provide visual cue, wrist band, yellow gown, etc. Monitor gait and stability. Monitor for mental status changes and reorient to person, place, and time. Review medications for side effects contributing to fall risk. Reinforce activity limits and safety measures with patient and family.

## 2021-01-01 NOTE — ED PROVIDER NOTE - NSFOLLOWUPINSTRUCTIONS_ED_ALL_ED_FT
You were seen in the emergency room today for groin and leg pain. You were found to have a urinary tract infection requiring antibiotics. Please  your prescription and take as directed. Immediately seek medical attention or return to the ER if you have signs or symptoms of an allergic reaction after taking the medication (including- rash or swelling, wheezing or shortness or breath, vomiting or belly pain).    Please get the next available appointment to see your primary doctor within 3 days. Return to the ER if you have any worsening symptoms.    We no longer feel that you need further emergency care or admission to the hospital at this time.    While we have determined that you are currently stable for discharge, we know that things can change. Please seek immediate medical attention or return to the ER if you experience any of the following:  Any worsening or persistent symptoms  Severe Pain  Chest Pain  Difficulty Breathing  Bleeding  Passing Out  Severe Rash  Inability to Eat or Drink  Persistent Fever    Please see a primary care doctor or specialist within 5 days to ensure that you are improving.    Please call the [facility] phone numbers on this document if you have any problems obtaining a follow up appointment.    I wish you well! -Dr Mayers

## 2021-01-01 NOTE — ED PROVIDER NOTE - PATIENT PORTAL LINK FT
You can access the FollowMyHealth Patient Portal offered by [facility] by registering at the following website: [email or web address]. By joining [employer]’s FollowMyHealth portal, you will also be able to view your health information using other applications (apps) compatible with our system.

## 2021-01-27 ENCOUNTER — INPATIENT (INPATIENT)
Facility: HOSPITAL | Age: 56
LOS: 3 days | Discharge: HOME | DRG: 177 | End: 2021-01-31
Attending: INTERNAL MEDICINE | Admitting: INTERNAL MEDICINE
Payer: MEDICAID

## 2021-01-27 VITALS
HEIGHT: 69 IN | SYSTOLIC BLOOD PRESSURE: 135 MMHG | DIASTOLIC BLOOD PRESSURE: 69 MMHG | OXYGEN SATURATION: 94 % | RESPIRATION RATE: 22 BRPM | HEART RATE: 106 BPM

## 2021-01-27 DIAGNOSIS — Z90.49 ACQUIRED ABSENCE OF OTHER SPECIFIED PARTS OF DIGESTIVE TRACT: Chronic | ICD-10-CM

## 2021-01-27 DIAGNOSIS — Z98.891 HISTORY OF UTERINE SCAR FROM PREVIOUS SURGERY: Chronic | ICD-10-CM

## 2021-01-27 DIAGNOSIS — Z90.89 ACQUIRED ABSENCE OF OTHER ORGANS: Chronic | ICD-10-CM

## 2021-01-27 DIAGNOSIS — R09.02 HYPOXEMIA: ICD-10-CM

## 2021-01-27 LAB
ALBUMIN SERPL ELPH-MCNC: 2.7 G/DL — LOW (ref 3.5–5)
ALP SERPL-CCNC: 94 U/L — SIGNIFICANT CHANGE UP (ref 40–120)
ALT FLD-CCNC: 71 U/L DA — HIGH (ref 10–60)
ANION GAP SERPL CALC-SCNC: 6 MMOL/L — SIGNIFICANT CHANGE UP (ref 5–17)
AST SERPL-CCNC: 31 U/L — SIGNIFICANT CHANGE UP (ref 10–40)
BASOPHILS # BLD AUTO: 0.01 K/UL — SIGNIFICANT CHANGE UP (ref 0–0.2)
BASOPHILS NFR BLD AUTO: 0.2 % — SIGNIFICANT CHANGE UP (ref 0–2)
BILIRUB SERPL-MCNC: 0.4 MG/DL — SIGNIFICANT CHANGE UP (ref 0.2–1.2)
BUN SERPL-MCNC: 14 MG/DL — SIGNIFICANT CHANGE UP (ref 7–18)
CALCIUM SERPL-MCNC: 9.5 MG/DL — SIGNIFICANT CHANGE UP (ref 8.4–10.5)
CHLORIDE SERPL-SCNC: 100 MMOL/L — SIGNIFICANT CHANGE UP (ref 96–108)
CO2 SERPL-SCNC: 26 MMOL/L — SIGNIFICANT CHANGE UP (ref 22–31)
CREAT SERPL-MCNC: 0.66 MG/DL — SIGNIFICANT CHANGE UP (ref 0.5–1.3)
D DIMER BLD IA.RAPID-MCNC: 433 NG/ML DDU — HIGH
EOSINOPHIL # BLD AUTO: 0 K/UL — SIGNIFICANT CHANGE UP (ref 0–0.5)
EOSINOPHIL NFR BLD AUTO: 0 % — SIGNIFICANT CHANGE UP (ref 0–6)
FERRITIN SERPL-MCNC: 523 NG/ML — HIGH (ref 15–150)
FIBRINOGEN PPP-MCNC: 1046 MG/DL — HIGH (ref 290–520)
GLUCOSE BLDC GLUCOMTR-MCNC: 330 MG/DL — HIGH (ref 70–99)
GLUCOSE BLDC GLUCOMTR-MCNC: 362 MG/DL — HIGH (ref 70–99)
GLUCOSE SERPL-MCNC: 280 MG/DL — HIGH (ref 70–99)
HCT VFR BLD CALC: 39.2 % — SIGNIFICANT CHANGE UP (ref 34.5–45)
HGB BLD-MCNC: 12.9 G/DL — SIGNIFICANT CHANGE UP (ref 11.5–15.5)
IMM GRANULOCYTES NFR BLD AUTO: 0.4 % — SIGNIFICANT CHANGE UP (ref 0–1.5)
LACTATE SERPL-SCNC: 1.3 MMOL/L — SIGNIFICANT CHANGE UP (ref 0.7–2)
LDH SERPL L TO P-CCNC: 257 U/L — HIGH (ref 120–225)
LYMPHOCYTES # BLD AUTO: 0.98 K/UL — LOW (ref 1–3.3)
LYMPHOCYTES # BLD AUTO: 19.4 % — SIGNIFICANT CHANGE UP (ref 13–44)
MCHC RBC-ENTMCNC: 30.1 PG — SIGNIFICANT CHANGE UP (ref 27–34)
MCHC RBC-ENTMCNC: 32.9 GM/DL — SIGNIFICANT CHANGE UP (ref 32–36)
MCV RBC AUTO: 91.6 FL — SIGNIFICANT CHANGE UP (ref 80–100)
MONOCYTES # BLD AUTO: 0.34 K/UL — SIGNIFICANT CHANGE UP (ref 0–0.9)
MONOCYTES NFR BLD AUTO: 6.7 % — SIGNIFICANT CHANGE UP (ref 2–14)
NEUTROPHILS # BLD AUTO: 3.69 K/UL — SIGNIFICANT CHANGE UP (ref 1.8–7.4)
NEUTROPHILS NFR BLD AUTO: 73.3 % — SIGNIFICANT CHANGE UP (ref 43–77)
NRBC # BLD: 0 /100 WBCS — SIGNIFICANT CHANGE UP (ref 0–0)
PLATELET # BLD AUTO: 164 K/UL — SIGNIFICANT CHANGE UP (ref 150–400)
POTASSIUM SERPL-MCNC: 3.9 MMOL/L — SIGNIFICANT CHANGE UP (ref 3.5–5.3)
POTASSIUM SERPL-SCNC: 3.9 MMOL/L — SIGNIFICANT CHANGE UP (ref 3.5–5.3)
PROCALCITONIN SERPL-MCNC: 0.13 NG/ML — HIGH (ref 0.02–0.1)
PROT SERPL-MCNC: 7.1 G/DL — SIGNIFICANT CHANGE UP (ref 6–8.3)
RBC # BLD: 4.28 M/UL — SIGNIFICANT CHANGE UP (ref 3.8–5.2)
RBC # FLD: 12.1 % — SIGNIFICANT CHANGE UP (ref 10.3–14.5)
SARS-COV-2 RNA SPEC QL NAA+PROBE: DETECTED
SODIUM SERPL-SCNC: 132 MMOL/L — LOW (ref 135–145)
WBC # BLD: 5.04 K/UL — SIGNIFICANT CHANGE UP (ref 3.8–10.5)
WBC # FLD AUTO: 5.04 K/UL — SIGNIFICANT CHANGE UP (ref 3.8–10.5)

## 2021-01-27 PROCEDURE — 99285 EMERGENCY DEPT VISIT HI MDM: CPT

## 2021-01-27 PROCEDURE — 71045 X-RAY EXAM CHEST 1 VIEW: CPT | Mod: 26

## 2021-01-27 RX ORDER — SODIUM CHLORIDE 9 MG/ML
1000 INJECTION INTRAMUSCULAR; INTRAVENOUS; SUBCUTANEOUS ONCE
Refills: 0 | Status: COMPLETED | OUTPATIENT
Start: 2021-01-27 | End: 2021-01-27

## 2021-01-27 RX ORDER — ENOXAPARIN SODIUM 100 MG/ML
40 INJECTION SUBCUTANEOUS DAILY
Refills: 0 | Status: DISCONTINUED | OUTPATIENT
Start: 2021-01-27 | End: 2021-01-31

## 2021-01-27 RX ORDER — DEXAMETHASONE 0.5 MG/5ML
6 ELIXIR ORAL ONCE
Refills: 0 | Status: COMPLETED | OUTPATIENT
Start: 2021-01-27 | End: 2021-01-27

## 2021-01-27 RX ORDER — INSULIN LISPRO 100/ML
VIAL (ML) SUBCUTANEOUS
Refills: 0 | Status: DISCONTINUED | OUTPATIENT
Start: 2021-01-27 | End: 2021-01-31

## 2021-01-27 RX ORDER — KETOROLAC TROMETHAMINE 30 MG/ML
30 SYRINGE (ML) INJECTION ONCE
Refills: 0 | Status: DISCONTINUED | OUTPATIENT
Start: 2021-01-27 | End: 2021-01-27

## 2021-01-27 RX ADMIN — Medication 6 MILLIGRAM(S): at 19:26

## 2021-01-27 RX ADMIN — SODIUM CHLORIDE 1000 MILLILITER(S): 9 INJECTION INTRAMUSCULAR; INTRAVENOUS; SUBCUTANEOUS at 19:26

## 2021-01-27 RX ADMIN — Medication 30 MILLIGRAM(S): at 17:53

## 2021-01-27 RX ADMIN — SODIUM CHLORIDE 1000 MILLILITER(S): 9 INJECTION INTRAMUSCULAR; INTRAVENOUS; SUBCUTANEOUS at 16:32

## 2021-01-27 NOTE — H&P ADULT - NSHPPHYSICALEXAM_GEN_ALL_CORE
ICU Vital Signs Last 24 Hrs  T(C): 36.7 (27 Jan 2021 22:24), Max: 37.4 (27 Jan 2021 19:31)  T(F): 98.1 (27 Jan 2021 22:24), Max: 99.3 (27 Jan 2021 19:31)  HR: 88 (27 Jan 2021 22:24) (86 - 106)  BP: 125/50 (27 Jan 2021 22:24) (103/64 - 135/69)  BP(mean): --  ABP: --  ABP(mean): --  RR: 18 (27 Jan 2021 22:24) (18 - 22)  SpO2: 97% (27 Jan 2021 22:24) (94% - 97%)        GENERAL: NAD, lying in bed comfortably  HEAD:  Atraumatic, Normocephalic  EYES: EOMI, PERRLA, conjunctiva and sclera clear  ENT: Moist mucous membranes  NECK: Supple, No JVD  CHEST/LUNG: Clear to auscultation bilaterally; No rales, rhonchi, wheezing, or rubs.  HEART: Regular rate and rhythm; S1+ S2+  ABDOMEN: Bowel sounds present; Soft, Nontender, Nondistended   EXTREMITIES:  2+ Peripheral Pulses, brisk capillary refill. No clubbing, cyanosis, or edema  NERVOUS SYSTEM:  Alert & Oriented X3, speech clear. No deficits   MSK: FROM all 4 extremities, full and equal strength  SKIN: No rashes or lesions

## 2021-01-27 NOTE — H&P ADULT - NSICDXPASTSURGICALHX_GEN_ALL_CORE_FT
PAST SURGICAL HISTORY:  S/P appendectomy     S/P      S/P cholecystectomy     S/P tonsillectomy

## 2021-01-27 NOTE — H&P ADULT - NSICDXPASTMEDICALHX_GEN_ALL_CORE_FT
PAST MEDICAL HISTORY:  CAD (coronary artery disease)     Diabetes     Fibromyalgia     Hypertension

## 2021-01-27 NOTE — H&P ADULT - HISTORY OF PRESENT ILLNESS
55 year old female, post menopausal, with PMHx of NIDDM and HTN presenting with fever, chills, and myalgias and diagnosis of COVID 19 since 1/19. Pt states she started having dry cough 3 days ago and worsening of chills. Pt also had a few episodes of diarrhea. Patient endorses loss of smell and loss of appetite but denies chest pain, or any other acute complaints.

## 2021-01-27 NOTE — H&P ADULT - ATTENDING COMMENTS
55 year old female, post menopausal, with PMHx of NIDDM (last dose last night ) and HTN presenting with fever, chills, and myalgias since 1/19 and diagnosed with COVID-19 the same day. She reports that 3 days ago, patient developed a dry cough, watery stools 2 times a day, and nausea with no vomiting. Patient also endorses loss of smell and loss of appetite but denies bright red blood per rectum, shortness of breath, chest pain, or any other acute complaints. Took Tylenol and Advil with last dose at 15:00 today.  Pt with fever 102.4 upon arrival      assessment  --  right lower lobe pneumonia possibly bacterial, covid 19 infection, h/o NIDDM, HTN     plan  --  admit to med, rocephin, zithromax, remdesevir, decadron, vit c, vitamin d, zinc, pepcid, singulair, contact and airborne isolation, cont albuterol inhaler, cont supportive care with tylenol prn, robitussin prn and O2 via nasal canula as needed    covid-19 antibody test, procalcitonin, D-dimer, esr, crp, ldh, ferritin, lactate, cbc, bmp, mg, phos, lipids, tsh, bld cx, ua, ucx, hrg a1c      pulm cons  id cons 55 year old female, post menopausal, with PMHx of NIDDM (last dose last night ) and HTN presenting with fever, chills, and myalgias since 1/19 and diagnosed with COVID-19 the same day. She reports that 3 days ago, patient developed a dry cough, watery stools 2 times a day, and nausea with no vomiting. Patient also endorses loss of smell and loss of appetite but denies bright red blood per rectum, shortness of breath, chest pain, or any other acute complaints. Took Tylenol and Advil with last dose at 15:00 today.  Pt with fever 102.4 upon arrival      assessment  --  right lower lobe pneumonia possibly bacterial, covid 19 infection, h/o NIDDM, HTN, cad     plan  --  admit to med, zithromax, remdesevir, decadron, vit c, vitamin d, zinc, pepcid, singulair, contact and airborne isolation, cont albuterol inhaler, cont supportive care with tylenol prn, robitussin prn and O2 via nasal canula as needed    covid-19 antibody test, procalcitonin, D-dimer, esr, crp, ldh, ferritin, lactate, cbc, bmp, mg, phos, lipids, tsh, bld cx, ua, ucx, hrg a1c      pulm cons  id cons

## 2021-01-27 NOTE — H&P ADULT - PROBLEM SELECTOR PLAN 5
IMPROVE VTE Individual Risk Assessment  RISK                                                         Points  [  ] Previous VTE                                      3  [  ] Thrombophilia                                   2  [  ] Lower limb paralysis                         2 (unable to hold up >15 seconds)    [  ] Current Cancer                                  2       (within 6 months)  [  ] Immobilization > 24 hrs                    1  [  ] ICU/CCU stay > 24 hrs                         1  [  ] Age > 60                                              1  cw lovenox for dvt ppx

## 2021-01-27 NOTE — H&P ADULT - PROBLEM SELECTOR PLAN 1
p/w SOB, cough   - WBC: WNL, lymphocyte; WNl   - started on decadron; start remdesivir if antibody is neg  - CXR RLL infiltrate  - Will c/w Rocephin and Azithro to cover for CAp.   - contact and airborne isolation precaution   - fu LDH, Procalcitonin, ferritin   - fu Blood culture   - Tylenol, Albuterol Inhaler for sxs tx

## 2021-01-27 NOTE — ED ADULT NURSE NOTE - OBJECTIVE STATEMENT
pt presents to ed for c/o fevers, chills, diarrhea, cough & mild sob. Endorses covid + on 1/19/21. Denies cp, leg swelling, HA, n/v, abdominal pain.

## 2021-01-27 NOTE — ED PROVIDER NOTE - CLINICAL SUMMARY MEDICAL DECISION MAKING FREE TEXT BOX
55 year old female with history of metabolic syndrome, now complaining of coughing, weakness, loss of appetite, diagnosed with COVID-19, and with low O2 saturation. Will order labs, x-ray, IV fluids, and reassess.

## 2021-01-27 NOTE — ED PROVIDER NOTE - OBJECTIVE STATEMENT
55 year old female, post menopausal, with PMHx of NIDDM (last dose last night ) and HTN presenting with fever, chills, and myalgias since 1/19 and diagnosed with COVID-19 the same day. She reports that 3 days ago, patient developed a dry cough, watery stools 2 times a day, and nausea with no vomiting. Patient also endorses loss of smell and loss of appetite but denies bright red blood per rectum, shortness of breath, chest pain, or any other acute complaints. Took Tylenol and Advil with last dose at 15:00 today. 55 year old female, post menopausal, with PMHx of NIDDM (last dose last night ) and HTN presenting with fever, chills, and myalgias since 1/19 and diagnosed with COVID-19 the same day. She reports that 3 days ago, patient developed a dry cough, watery stools 2 times a day, and nausea with no vomiting. Patient also endorses loss of smell and loss of appetite but denies bright red blood per rectum, shortness of breath, chest pain, or any other acute complaints. Took Tylenol and Advil with last dose at 15:00 today.  Pt with fever 102.4 upon arrival

## 2021-01-27 NOTE — H&P ADULT - ASSESSMENT
55 year old female, post menopausal, with PMHx of NIDDM and HTN presenting with fever, chills, and myalgias and diagnosis of COVID 19 since 1/19. Pt is admitted for AHRF 2/2 covid Pna

## 2021-01-28 ENCOUNTER — TRANSCRIPTION ENCOUNTER (OUTPATIENT)
Age: 56
End: 2021-01-28

## 2021-01-28 DIAGNOSIS — M79.7 FIBROMYALGIA: ICD-10-CM

## 2021-01-28 DIAGNOSIS — E11.9 TYPE 2 DIABETES MELLITUS WITHOUT COMPLICATIONS: ICD-10-CM

## 2021-01-28 DIAGNOSIS — U07.1 COVID-19: ICD-10-CM

## 2021-01-28 DIAGNOSIS — Z29.9 ENCOUNTER FOR PROPHYLACTIC MEASURES, UNSPECIFIED: ICD-10-CM

## 2021-01-28 DIAGNOSIS — I10 ESSENTIAL (PRIMARY) HYPERTENSION: ICD-10-CM

## 2021-01-28 DIAGNOSIS — I25.10 ATHEROSCLEROTIC HEART DISEASE OF NATIVE CORONARY ARTERY WITHOUT ANGINA PECTORIS: ICD-10-CM

## 2021-01-28 LAB
A1C WITH ESTIMATED AVERAGE GLUCOSE RESULT: 9.6 % — HIGH (ref 4–5.6)
ANION GAP SERPL CALC-SCNC: 8 MMOL/L — SIGNIFICANT CHANGE UP (ref 5–17)
BUN SERPL-MCNC: 19 MG/DL — HIGH (ref 7–18)
CALCIUM SERPL-MCNC: 9.8 MG/DL — SIGNIFICANT CHANGE UP (ref 8.4–10.5)
CHLORIDE SERPL-SCNC: 101 MMOL/L — SIGNIFICANT CHANGE UP (ref 96–108)
CHOLEST SERPL-MCNC: 171 MG/DL — SIGNIFICANT CHANGE UP
CK SERPL-CCNC: 45 U/L — SIGNIFICANT CHANGE UP (ref 21–215)
CO2 SERPL-SCNC: 26 MMOL/L — SIGNIFICANT CHANGE UP (ref 22–31)
CREAT SERPL-MCNC: 0.58 MG/DL — SIGNIFICANT CHANGE UP (ref 0.5–1.3)
CRP SERPL-MCNC: 16.22 MG/DL — HIGH (ref 0–0.4)
ERYTHROCYTE [SEDIMENTATION RATE] IN BLOOD: 68 MM/HR — HIGH (ref 0–20)
ESTIMATED AVERAGE GLUCOSE: 229 MG/DL — HIGH (ref 68–114)
FERRITIN SERPL-MCNC: 545 NG/ML — HIGH (ref 15–150)
FOLATE SERPL-MCNC: >20 NG/ML — SIGNIFICANT CHANGE UP
GLUCOSE BLDC GLUCOMTR-MCNC: 261 MG/DL — HIGH (ref 70–99)
GLUCOSE BLDC GLUCOMTR-MCNC: 280 MG/DL — HIGH (ref 70–99)
GLUCOSE BLDC GLUCOMTR-MCNC: 288 MG/DL — HIGH (ref 70–99)
GLUCOSE BLDC GLUCOMTR-MCNC: 294 MG/DL — HIGH (ref 70–99)
GLUCOSE BLDC GLUCOMTR-MCNC: 322 MG/DL — HIGH (ref 70–99)
GLUCOSE BLDC GLUCOMTR-MCNC: 325 MG/DL — HIGH (ref 70–99)
GLUCOSE BLDC GLUCOMTR-MCNC: 405 MG/DL — HIGH (ref 70–99)
GLUCOSE SERPL-MCNC: 299 MG/DL — HIGH (ref 70–99)
HCT VFR BLD CALC: 38.8 % — SIGNIFICANT CHANGE UP (ref 34.5–45)
HDLC SERPL-MCNC: 19 MG/DL — LOW
HGB BLD-MCNC: 12.6 G/DL — SIGNIFICANT CHANGE UP (ref 11.5–15.5)
IRON SATN MFR SERPL: 39 UG/DL — LOW (ref 40–150)
LDH SERPL L TO P-CCNC: 223 U/L — SIGNIFICANT CHANGE UP (ref 120–225)
LIPID PNL WITH DIRECT LDL SERPL: 120 MG/DL — HIGH
MAGNESIUM SERPL-MCNC: 2.2 MG/DL — SIGNIFICANT CHANGE UP (ref 1.6–2.6)
MCHC RBC-ENTMCNC: 29.6 PG — SIGNIFICANT CHANGE UP (ref 27–34)
MCHC RBC-ENTMCNC: 32.5 GM/DL — SIGNIFICANT CHANGE UP (ref 32–36)
MCV RBC AUTO: 91.1 FL — SIGNIFICANT CHANGE UP (ref 80–100)
NON HDL CHOLESTEROL: 152 MG/DL — HIGH
NRBC # BLD: 0 /100 WBCS — SIGNIFICANT CHANGE UP (ref 0–0)
PHOSPHATE SERPL-MCNC: 2.7 MG/DL — SIGNIFICANT CHANGE UP (ref 2.5–4.5)
PLATELET # BLD AUTO: 181 K/UL — SIGNIFICANT CHANGE UP (ref 150–400)
POTASSIUM SERPL-MCNC: 4.3 MMOL/L — SIGNIFICANT CHANGE UP (ref 3.5–5.3)
POTASSIUM SERPL-SCNC: 4.3 MMOL/L — SIGNIFICANT CHANGE UP (ref 3.5–5.3)
RBC # BLD: 4.26 M/UL — SIGNIFICANT CHANGE UP (ref 3.8–5.2)
RBC # FLD: 12.1 % — SIGNIFICANT CHANGE UP (ref 10.3–14.5)
SARS-COV-2 IGG SERPL QL IA: POSITIVE
SARS-COV-2 IGM SERPL IA-ACNC: 27.1 INDEX — HIGH
SODIUM SERPL-SCNC: 135 MMOL/L — SIGNIFICANT CHANGE UP (ref 135–145)
TRIGL SERPL-MCNC: 159 MG/DL — HIGH
TROPONIN I SERPL-MCNC: <0.015 NG/ML — SIGNIFICANT CHANGE UP (ref 0–0.04)
TSH SERPL-MCNC: 0.42 UU/ML — SIGNIFICANT CHANGE UP (ref 0.34–4.82)
VIT B12 SERPL-MCNC: 1114 PG/ML — SIGNIFICANT CHANGE UP (ref 232–1245)
WBC # BLD: 3.02 K/UL — LOW (ref 3.8–10.5)
WBC # FLD AUTO: 3.02 K/UL — LOW (ref 3.8–10.5)

## 2021-01-28 RX ORDER — ALBUTEROL 90 UG/1
2 AEROSOL, METERED ORAL EVERY 6 HOURS
Refills: 0 | Status: DISCONTINUED | OUTPATIENT
Start: 2021-01-28 | End: 2021-01-31

## 2021-01-28 RX ORDER — INSULIN DETEMIR 100/ML (3)
30 INSULIN PEN (ML) SUBCUTANEOUS
Qty: 0 | Refills: 0 | DISCHARGE

## 2021-01-28 RX ORDER — INSULIN HUMAN 100 [IU]/ML
7 INJECTION, SOLUTION SUBCUTANEOUS ONCE
Refills: 0 | Status: DISCONTINUED | OUTPATIENT
Start: 2021-01-28 | End: 2021-01-28

## 2021-01-28 RX ORDER — INSULIN GLARGINE 100 [IU]/ML
35 INJECTION, SOLUTION SUBCUTANEOUS AT BEDTIME
Refills: 0 | Status: DISCONTINUED | OUTPATIENT
Start: 2021-01-28 | End: 2021-01-29

## 2021-01-28 RX ORDER — DEXTROSE 50 % IN WATER 50 %
15 SYRINGE (ML) INTRAVENOUS ONCE
Refills: 0 | Status: DISCONTINUED | OUTPATIENT
Start: 2021-01-28 | End: 2021-01-31

## 2021-01-28 RX ORDER — ASCORBIC ACID 60 MG
500 TABLET,CHEWABLE ORAL DAILY
Refills: 0 | Status: DISCONTINUED | OUTPATIENT
Start: 2021-01-28 | End: 2021-01-29

## 2021-01-28 RX ORDER — VALSARTAN 80 MG/1
160 TABLET ORAL DAILY
Refills: 0 | Status: DISCONTINUED | OUTPATIENT
Start: 2021-01-28 | End: 2021-01-31

## 2021-01-28 RX ORDER — INSULIN DETEMIR 100/ML (3)
30 INSULIN PEN (ML) SUBCUTANEOUS AT BEDTIME
Refills: 0 | Status: DISCONTINUED | OUTPATIENT
Start: 2021-01-28 | End: 2021-01-28

## 2021-01-28 RX ORDER — DEXAMETHASONE 0.5 MG/5ML
6 ELIXIR ORAL DAILY
Refills: 0 | Status: DISCONTINUED | OUTPATIENT
Start: 2021-01-28 | End: 2021-01-31

## 2021-01-28 RX ORDER — VALSARTAN 80 MG/1
1 TABLET ORAL
Qty: 0 | Refills: 0 | DISCHARGE

## 2021-01-28 RX ORDER — GLUCAGON INJECTION, SOLUTION 0.5 MG/.1ML
1 INJECTION, SOLUTION SUBCUTANEOUS ONCE
Refills: 0 | Status: DISCONTINUED | OUTPATIENT
Start: 2021-01-28 | End: 2021-01-31

## 2021-01-28 RX ORDER — ZINC SULFATE TAB 220 MG (50 MG ZINC EQUIVALENT) 220 (50 ZN) MG
220 TAB ORAL DAILY
Refills: 0 | Status: DISCONTINUED | OUTPATIENT
Start: 2021-01-28 | End: 2021-01-31

## 2021-01-28 RX ORDER — CLOPIDOGREL BISULFATE 75 MG/1
75 TABLET, FILM COATED ORAL DAILY
Refills: 0 | Status: DISCONTINUED | OUTPATIENT
Start: 2021-01-28 | End: 2021-01-31

## 2021-01-28 RX ORDER — AZITHROMYCIN 500 MG/1
500 TABLET, FILM COATED ORAL EVERY 24 HOURS
Refills: 0 | Status: DISCONTINUED | OUTPATIENT
Start: 2021-01-28 | End: 2021-01-31

## 2021-01-28 RX ORDER — ERGOCALCIFEROL 1.25 MG/1
50000 CAPSULE ORAL ONCE
Refills: 0 | Status: COMPLETED | OUTPATIENT
Start: 2021-01-28 | End: 2021-01-28

## 2021-01-28 RX ORDER — ATORVASTATIN CALCIUM 80 MG/1
40 TABLET, FILM COATED ORAL AT BEDTIME
Refills: 0 | Status: DISCONTINUED | OUTPATIENT
Start: 2021-01-28 | End: 2021-01-31

## 2021-01-28 RX ORDER — LIRAGLUTIDE 6 MG/ML
18 INJECTION SUBCUTANEOUS
Qty: 0 | Refills: 0 | DISCHARGE

## 2021-01-28 RX ORDER — SODIUM CHLORIDE 9 MG/ML
1000 INJECTION, SOLUTION INTRAVENOUS
Refills: 0 | Status: DISCONTINUED | OUTPATIENT
Start: 2021-01-28 | End: 2021-01-31

## 2021-01-28 RX ORDER — INSULIN GLARGINE 100 [IU]/ML
30 INJECTION, SOLUTION SUBCUTANEOUS AT BEDTIME
Refills: 0 | Status: DISCONTINUED | OUTPATIENT
Start: 2021-01-28 | End: 2021-01-28

## 2021-01-28 RX ORDER — INSULIN LISPRO 100/ML
2 VIAL (ML) SUBCUTANEOUS
Refills: 0 | Status: DISCONTINUED | OUTPATIENT
Start: 2021-01-29 | End: 2021-01-29

## 2021-01-28 RX ORDER — ASPIRIN/CALCIUM CARB/MAGNESIUM 324 MG
81 TABLET ORAL DAILY
Refills: 0 | Status: DISCONTINUED | OUTPATIENT
Start: 2021-01-28 | End: 2021-01-31

## 2021-01-28 RX ADMIN — Medication 3: at 08:15

## 2021-01-28 RX ADMIN — Medication 500 MILLIGRAM(S): at 12:17

## 2021-01-28 RX ADMIN — ATORVASTATIN CALCIUM 40 MILLIGRAM(S): 80 TABLET, FILM COATED ORAL at 22:21

## 2021-01-28 RX ADMIN — Medication 81 MILLIGRAM(S): at 13:01

## 2021-01-28 RX ADMIN — CLOPIDOGREL BISULFATE 75 MILLIGRAM(S): 75 TABLET, FILM COATED ORAL at 13:01

## 2021-01-28 RX ADMIN — Medication 6: at 00:24

## 2021-01-28 RX ADMIN — ENOXAPARIN SODIUM 40 MILLIGRAM(S): 100 INJECTION SUBCUTANEOUS at 12:18

## 2021-01-28 RX ADMIN — ZINC SULFATE TAB 220 MG (50 MG ZINC EQUIVALENT) 220 MILLIGRAM(S): 220 (50 ZN) TAB at 12:17

## 2021-01-28 RX ADMIN — ALBUTEROL 2 PUFF(S): 90 AEROSOL, METERED ORAL at 05:15

## 2021-01-28 RX ADMIN — INSULIN GLARGINE 35 UNIT(S): 100 INJECTION, SOLUTION SUBCUTANEOUS at 22:15

## 2021-01-28 RX ADMIN — ERGOCALCIFEROL 50000 UNIT(S): 1.25 CAPSULE ORAL at 17:54

## 2021-01-28 RX ADMIN — Medication 6 MILLIGRAM(S): at 17:51

## 2021-01-28 RX ADMIN — Medication 4: at 12:17

## 2021-01-28 RX ADMIN — Medication 200 MILLIGRAM(S): at 17:54

## 2021-01-28 RX ADMIN — AZITHROMYCIN 255 MILLIGRAM(S): 500 TABLET, FILM COATED ORAL at 08:14

## 2021-01-28 RX ADMIN — Medication 3: at 17:33

## 2021-01-28 RX ADMIN — VALSARTAN 160 MILLIGRAM(S): 80 TABLET ORAL at 13:01

## 2021-01-28 RX ADMIN — Medication 3: at 22:15

## 2021-01-28 NOTE — PROVIDER CONTACT NOTE (OTHER) - BACKGROUND
pt admitted covid +; upon arrival on unit from ED-, 405, and 325; second Blood sugar had to be checked 2/2 60 minutes window for insulin coverage lapse-upon recheck 6 units of insulin given

## 2021-01-28 NOTE — CONSULT NOTE ADULT - SUBJECTIVE AND OBJECTIVE BOX
Patient is a 55y old  Female who presents with a chief complaint of fever chills (2021 18:49)      REVIEW OF SYSTEMS: Total of twelve systems have been reviewed with patient and found to be negative unless mentioned in HPI      PAST MEDICAL & SURGICAL HISTORY:  CAD (coronary artery disease)  Fibromyalgia  Hypertension  Diabetes  S/P   S/P cholecystectomy  S/P appendectomy  S/P tonsillectomy      SOCIAL HISTORY  Alcohol: Does not drink  Tobacco: Does not smoke  Illicit substance use: None      FAMILY HISTORY: Non contributory to the present illness        ALLERGIES: Cipro (Short breath)  penicillin (Rash)        Vital Signs Last 24 Hrs  T(C): 37.1 (2021 16:23), Max: 37.1 (2021 16:23)  T(F): 98.8 (2021 16:23), Max: 98.8 (2021 16:23)  HR: 85 (2021 16:23) (81 - 85)  BP: 134/79 (2021 16:23) (134/79 - 146/63)  BP(mean): --  RR: 18 (2021 16:23) (18 - 18)  SpO2: 97% (2021 17:16) (93% - 98%)      PHYSICAL EXAM:  GENERAL: Not in distress   CHEST/LUNG:  Aire ntry bilaterally  HEART: s1 and s2 present  ABDOMEN:  Nontender and  Nondistended  EXTREMITIES: No pedal  edema  CNS: Awake and Alert      LABS:                        12.6   3.02  )-----------( 181      ( 2021 08:42 )             38.8           135  |  101  |  19<H>  ----------------------------<  299<H>  4.3   |  26  |  0.58    Ca    9.8      2021 08:42  Phos  2.7       Mg     2.2         TPro  7.1  /  Alb  2.7<L>  /  TBili  0.4  /  DBili  x   /  AST  31  /  ALT  71<H>  /  AlkPhos  94          CAPILLARY BLOOD GLUCOSE  POCT Blood Glucose.: 288 mg/dL (2021 21:16)  POCT Blood Glucose.: 261 mg/dL (2021 16:40)  POCT Blood Glucose.: 322 mg/dL (2021 )        MEDICATIONS  (STANDING):  ascorbic acid 500 milliGRAM(s) Oral daily  aspirin  chewable 81 milliGRAM(s) Oral daily  atorvastatin 40 milliGRAM(s) Oral at bedtime  azithromycin  IVPB 500 milliGRAM(s) IV Intermittent every 24 hours  clopidogrel Tablet 75 milliGRAM(s) Oral daily  dexAMETHasone  Injectable 6 milliGRAM(s) IV Push daily  dextrose 40% Gel 15 Gram(s) Oral once  dextrose 5%. 1000 milliLiter(s) (50 mL/Hr) IV Continuous <Continuous>  dextrose 5%. 1000 milliLiter(s) (100 mL/Hr) IV Continuous <Continuous>  enoxaparin Injectable 40 milliGRAM(s) SubCutaneous daily  glucagon  Injectable 1 milliGRAM(s) IntraMuscular once  insulin glargine Injectable (LANTUS) 35 Unit(s) SubCutaneous at bedtime  insulin lispro (ADMELOG) corrective regimen sliding scale   SubCutaneous Before meals and at bedtime  valsartan 160 milliGRAM(s) Oral daily  zinc sulfate 220 milliGRAM(s) Oral daily    MEDICATIONS  (PRN):  ALBUTerol    90 MICROgram(s) HFA Inhaler 2 Puff(s) Inhalation every 6 hours PRN Bronchospasm  guaiFENesin   Syrup  (Sugar-Free) 200 milliGRAM(s) Oral every 6 hours PRN Cough        RADIOLOGY & ADDITIONAL TESTS:    rad< from: Xray Chest 1 View-PORTABLE IMMEDIATE (21 @ 17:00) >  The lungs show RIGHT lung base linear atelectasis/infiltrate. Remaining visualized lung parenchyma clear No pneumothorax.    Heart size within normal limits allowing for magnification effect of AP projection. Visualized osseous structures are intact.      MICROBIOLOGY DATA:    COVID-19 Antibody - for prior infection screening (21 @ 15:26)   COVID-19 IgG Antibody Index: 27.10:   COVID-19 PCR . (21 @ 16:57)   COVID-19 PCR: Detected               Patient is a 55y old  Female who is post menopausal, with PMHx of NIDDM and HTN, presents to the ER for evaluation of fever, chills, and myalgias and diagnosed with COVID 19 since 21. She started having dry cough, worsening chills, and a few episodes of diarrhea. Patient endorses loss of smell and loss of appetite but denies chest pain, or any other acute complaints. On admission, she found to have low grade fever, tachycardia and tachypnea with hypoxia, and now saturating well on supplemental oxygenation via NC. She has started on Dexamethasone and the ID consult requested to assist with further management.       REVIEW OF SYSTEMS: Total of twelve systems have been reviewed and found to be negative unless mentioned in HPI      PAST MEDICAL & SURGICAL HISTORY:  CAD (coronary artery disease)  Fibromyalgia  Hypertension  Diabetes  S/P   S/P cholecystectomy  S/P appendectomy  S/P tonsillectomy      SOCIAL HISTORY  Alcohol: Does not drink  Tobacco: Does not smoke  Illicit substance use: None      FAMILY HISTORY: Non contributory to the present illness        ALLERGIES: Cipro (Short breath)  penicillin (Rash)      Vital Signs Last 24 Hrs  T(C): 37.1 (2021 16:23), Max: 37.1 (2021 16:23)  T(F): 98.8 (2021 16:23), Max: 98.8 (2021 16:23)  HR: 85 (2021 16:23) (81 - 85)  BP: 134/79 (2021 16:23) (134/79 - 146/63)  BP(mean): --  RR: 18 (2021 16:23) (18 - 18)  SpO2: 97% (2021 17:16) (93% - 98%)      PHYSICAL EXAM:  GENERAL: Not in distress, on oxygen via NC  CHEST/LUNG: Not using accessory muscles   HEART: s1 and s2 present  ABDOMEN:  Nontender and  Nondistended  EXTREMITIES: No pedal  edema  CNS: Awake and Alert      LABS:                        12.6   3.02  )-----------( 181      ( 2021 08:42 )             38.8           135  |  101  |  19<H>  ----------------------------<  299<H>  4.3   |  26  |  0.58    Ca    9.8      2021 08:42  Phos  2.7       Mg     2.2         TPro  7.1  /  Alb  2.7<L>  /  TBili  0.4  /  DBili  x   /  AST  31  /  ALT  71<H>  /  AlkPhos  94          CAPILLARY BLOOD GLUCOSE  POCT Blood Glucose.: 288 mg/dL (2021 21:16)  POCT Blood Glucose.: 261 mg/dL (2021 16:40)  POCT Blood Glucose.: 322 mg/dL (2021 )        MEDICATIONS  (STANDING):  ascorbic acid 500 milliGRAM(s) Oral daily  aspirin  chewable 81 milliGRAM(s) Oral daily  atorvastatin 40 milliGRAM(s) Oral at bedtime  azithromycin  IVPB 500 milliGRAM(s) IV Intermittent every 24 hours  clopidogrel Tablet 75 milliGRAM(s) Oral daily  dexAMETHasone  Injectable 6 milliGRAM(s) IV Push daily  dextrose 40% Gel 15 Gram(s) Oral once  dextrose 5%. 1000 milliLiter(s) (50 mL/Hr) IV Continuous <Continuous>  dextrose 5%. 1000 milliLiter(s) (100 mL/Hr) IV Continuous <Continuous>  enoxaparin Injectable 40 milliGRAM(s) SubCutaneous daily  glucagon  Injectable 1 milliGRAM(s) IntraMuscular once  insulin glargine Injectable (LANTUS) 35 Unit(s) SubCutaneous at bedtime  insulin lispro (ADMELOG) corrective regimen sliding scale   SubCutaneous Before meals and at bedtime  valsartan 160 milliGRAM(s) Oral daily  zinc sulfate 220 milliGRAM(s) Oral daily    MEDICATIONS  (PRN):  ALBUTerol    90 MICROgram(s) HFA Inhaler 2 Puff(s) Inhalation every 6 hours PRN Bronchospasm  guaiFENesin   Syrup  (Sugar-Free) 200 milliGRAM(s) Oral every 6 hours PRN Cough        RADIOLOGY & ADDITIONAL TESTS:    21< from: Xray Chest 1 View-PORTABLE IMMEDIATE (21 @ 17:00) The lungs show RIGHT lung base linear atelectasis/infiltrate. Remaining visualized lung parenchyma clear No pneumothorax.    Heart size within normal limits allowing for magnification effect of AP projection. Visualized osseous structures are intact.      MICROBIOLOGY DATA:    D-Dimer Assay, Quantitative (21 @ 16:59) : 433 ng/mL DDU   COVID-19 Antibody - for prior infection screening (21 @ 15:26)   COVID-19 IgG Antibody Index: 27.10:   COVID-19 PCR . (21 @ 16:57)   COVID-19 PCR: Detected

## 2021-01-28 NOTE — DISCHARGE NOTE PROVIDER - CARE PROVIDER_API CALL
Joann Wakefield)  EndocrinologyMetabDiabetes  [address] [place] [address]  Phone: [phone]  Fax: [phone]  Follow Up Time:

## 2021-01-28 NOTE — PROGRESS NOTE ADULT - SUBJECTIVE AND OBJECTIVE BOX
Patient is a 55y old  Female who presents with a chief complaint of fever chills (2021 22:11)    pt seen in icu [  ], reg med floor [   ], bed [  ], chair at bedside [   ], a+o x3 [  ], lethargic [  ],  nad [  ]    hsieh [  ], ngt [  ], peg [  ], et tube [  ], cent line [  ], picc line [  ]        Allergies    Cipro (Short breath)  penicillin (Rash)        Vitals    T(F): 98.1 (21 @ 22:24), Max: 99.3 (21 @ 19:31)  HR: 88 (21 @ 22:24) (86 - 106)  BP: 125/50 (21 @ 22:24) (103/64 - 135/69)  RR: 18 (21 @ 22:24) (18 - 22)  SpO2: 97% (21 @ 22:24) (94% - 97%)  Wt(kg): --  CAPILLARY BLOOD GLUCOSE      POCT Blood Glucose.: 294 mg/dL (2021 06:05)      Labs                          12.9   5.04  )-----------( 164      ( 2021 16:59 )             39.2           132<L>  |  100  |  14  ----------------------------<  280<H>  3.9   |  26  |  0.66    Ca    9.5      2021 16:59    TPro  7.1  /  Alb  2.7<L>  /  TBili  0.4  /  DBili  x   /  AST  31  /  ALT  71<H>  /  AlkPhos  94                  Radiology Results      Meds    MEDICATIONS  (STANDING):  ascorbic acid 500 milliGRAM(s) Oral daily  aspirin  chewable 81 milliGRAM(s) Oral daily  atorvastatin 40 milliGRAM(s) Oral at bedtime  azithromycin  IVPB 500 milliGRAM(s) IV Intermittent every 24 hours  clopidogrel Tablet 75 milliGRAM(s) Oral daily  dexAMETHasone  Injectable 6 milliGRAM(s) IV Push daily  enoxaparin Injectable 40 milliGRAM(s) SubCutaneous daily  ergocalciferol 54153 Unit(s) Oral once  insulin glargine Injectable (LANTUS) 30 Unit(s) SubCutaneous at bedtime  insulin lispro (ADMELOG) corrective regimen sliding scale   SubCutaneous Before meals and at bedtime  zinc sulfate 220 milliGRAM(s) Oral daily      MEDICATIONS  (PRN):  ALBUTerol    90 MICROgram(s) HFA Inhaler 2 Puff(s) Inhalation every 6 hours PRN Bronchospasm      Physical Exam    Neuro :  no focal deficits  Respiratory: CTA B/L  CV: RRR, S1S2, no murmurs,   Abdominal: Soft, NT, ND +BS,  Extremities: No edema, + peripheral pulses    ASSESSMENT    Hypoxemia    CAD (coronary artery disease)    Fibromyalgia    Hypertension    Diabetes    S/P     S/P cholecystectomy    S/P appendectomy    S/P tonsillectomy    No significant past surgical history        PLAN     Patient is a 55y old  Female who presents with a chief complaint of fever chills (27 Jan 2021 22:11)    pt seen in icu [  ], reg med floor [ x  ], bed [ x ], chair at bedside [   ], a+o x3 [ x ], lethargic [  ],  nad [x  ]        Allergies    Cipro (Short breath)  penicillin (Rash)        Vitals    T(F): 98.1 (01-27-21 @ 22:24), Max: 99.3 (01-27-21 @ 19:31)  HR: 88 (01-27-21 @ 22:24) (86 - 106)  BP: 125/50 (01-27-21 @ 22:24) (103/64 - 135/69)  RR: 18 (01-27-21 @ 22:24) (18 - 22)  SpO2: 97% (01-27-21 @ 22:24) (94% - 97%)  Wt(kg): --  CAPILLARY BLOOD GLUCOSE      POCT Blood Glucose.: 294 mg/dL (28 Jan 2021 06:05)      Labs                          12.9   5.04  )-----------( 164      ( 27 Jan 2021 16:59 )             39.2       01-27    132<L>  |  100  |  14  ----------------------------<  280<H>  3.9   |  26  |  0.66    Ca    9.5      27 Jan 2021 16:59    TPro  7.1  /  Alb  2.7<L>  /  TBili  0.4  /  DBili  x   /  AST  31  /  ALT  71<H>  /  AlkPhos  94  01-27                Radiology Results      Meds    MEDICATIONS  (STANDING):  ascorbic acid 500 milliGRAM(s) Oral daily  aspirin  chewable 81 milliGRAM(s) Oral daily  atorvastatin 40 milliGRAM(s) Oral at bedtime  azithromycin  IVPB 500 milliGRAM(s) IV Intermittent every 24 hours  clopidogrel Tablet 75 milliGRAM(s) Oral daily  dexAMETHasone  Injectable 6 milliGRAM(s) IV Push daily  enoxaparin Injectable 40 milliGRAM(s) SubCutaneous daily  ergocalciferol 25317 Unit(s) Oral once  insulin glargine Injectable (LANTUS) 30 Unit(s) SubCutaneous at bedtime  insulin lispro (ADMELOG) corrective regimen sliding scale   SubCutaneous Before meals and at bedtime  zinc sulfate 220 milliGRAM(s) Oral daily      MEDICATIONS  (PRN):  ALBUTerol    90 MICROgram(s) HFA Inhaler 2 Puff(s) Inhalation every 6 hours PRN Bronchospasm      Physical Exam    Neuro :  no focal deficits  Respiratory: CTA B/L  CV: RRR, S1S2, no murmurs,   Abdominal: Soft, NT, ND +BS,  Extremities: No edema, + peripheral pulses    ASSESSMENT    right lower lobe pneumonia possibly bacterial,   covid 19 infection,   h/o NIDDM,   HTN,   CAD (coronary artery disease)  Fibromyalgia  cholecystectomy  appendectomy  tonsillectomy        PLAN    cont zithromax,   id cons  f/u blood and ucx  remdesivir if covid ab neg   cont decadron,   cont vit c, vitamin d, zinc, pepcid, singulair,   contact and airborne isolation,   pulm cons   cont albuterol inhaler,   tmx 99.3   cont tylenol prn,   cont robitussin prn   O2 sat   (94% - 97%) on n/c 2L  O2 via nasal canula as needed  f/u covid-19 antibody test,   f/u procalcitonin, D-dimer, esr, crp, ldh, ferritin, lactate,   hgba1c  cont current meds

## 2021-01-28 NOTE — PROGRESS NOTE ADULT - PROBLEM SELECTOR PLAN 1
p/w SOB, cough   - WBC: WNL, lymphocyte; WNl   - started on decadron; start remdesivir if antibody is neg  - CXR RLL infiltrate  - Will c/w Rocephin and Azithro to cover for CAp.   - contact and airborne isolation precaution   - fu LDH, Procalcitonin, ferritin   - fu Blood culture   - Tylenol, Albuterol Inhaler for sxs tx p/w SOB, cough   - WBC: WNL, lymphocyte; WNl   - started on decadron day 1    start remdesivir if antibody is neg  - CXR RLL infiltrate  - Will c/w  Azithro to cover for CAp.   - contact and airborne isolation precaution   - fu LDH, Procalcitonin, ferritin   - fu Blood culture   - Tylenol, Albuterol Inhaler for sxs tx

## 2021-01-28 NOTE — DISCHARGE NOTE PROVIDER - NSDCCPCAREPLAN_GEN_ALL_CORE_FT
PRINCIPAL DISCHARGE DIAGNOSIS  Diagnosis: COVID-19  Assessment and Plan of Treatment: CORONAVIRUS INSTRUCTIONS:   Based on your current clinical status and stability, it has been determined that you no longer need hospitalization and can recover while remaining in self-quarantine at home. You should follow the prevention steps below until a healthcare provider or local or state health department says you can return to your normal activities.   1. You should restrict activities outside your home, except for getting medical care.   2. Do not go to work, school, or public areas.   3. Avoid using public transportation, ride-sharing, or taxis.   4. Separate yourself from other people and animals in your home as much as possible.  When you are around other people (e.g., sharing a room or vehicle) you should wear a facemask.  5. Wash your hands often with soap and water for at least 20 seconds, especially after blowing your nose, coughing, or sneezing; going to the bathroom; and before eating or preparing food.  6. Cover your mouth and nose with a tissue when you cough or sneeze. Throw used tissues in a lined trash can. Immediately wash your hands with soap and water for at least 20 seconds  7. High touch surfaces include counters, tabletops, doorknobs, bathroom fixtures, toilets, phones, keyboards, tablets, and bedside tables.  8. Avoid sharing dishes, drinking glasses, cups, eating utensils, towels, or bedding with other people or pets in your home. After using these items, they should be washed thoroughly with soap and water.  You are strongly advised to seek prompt medical attention if your illness worsens or you develop new symptoms like fever or difficulty breathing.        SECONDARY DISCHARGE DIAGNOSES  Diagnosis: Hypertension  Assessment and Plan of Treatment: Blood Pressure Control , Please continue current medication regimen, and follow up with your PCP  - You have a history of Hypertension.    - You should continue on the current antihypertensive regimen regularly.  - You blood pressure should be within 140-120/80-90.  - You should follow-up with your PCP within 1 week of your discharge for routine blood pressure monitoring at your next visit.  - Notify your doctor if you have any of the following symptoms:   (Dizziness, Lightheadedness, Blurry vision, Headache, Chest pain, Shortness of breath.)  - You should maintain healthy lifestyle by eating healthy low salt diet, avoid fatty food, weight loss, exercise regularly as tolerated 30 mins X 3 time per week.      Diagnosis: Diabetes  Assessment and Plan of Treatment: Maintaining blood glucose level within normal range.  - You have a history of diabetes  - You should continue to take your medication regimen regularly as prescribed  - Please follow up with your primary care provider/endocrinologist within a week of discharge.  - You need to continue monitoring your blood sugar levels closely.  - Please maintain healthy lifestyle by eating healthy diabetic regimen, weight loss and exercise regularly as tolerated.  Make sure you get your HgA1c checked every three months.  It's important not to skip any meals.  Keep a log of your blood glucose results and always take it with you to your doctor appointments.  Keep a list of your current medications including injectables and over the counter medications and bring this medication list with you to all your doctor appointments.  If you have not seen your ophthalmologist this year call for appointment.  Check your feet daily for redness, sores, or openings. Do not self treat. If no improvement in two days call your primary care physician for an appointment.  Low blood sugar (hypoglycemia) is a blood sugar below 70mg/dl. Check your blood sugar if you feel signs/symptoms of hypoglycemia. If your blood sugar is below 70 take 15 grams of carbohydrates (ex 4 oz of apple juice, 3-4 glucose tablets, or 4-6 oz of regular soda) wait 15 minutes and repeat blood sugar to make sure it comes up above 70.  If your blood sugar is above 70 and you are due for a meal, have a meal.  If you are not due for a meal have a snack.  This snack helps keeps your blood sugar at a safe range.      Diagnosis: CAD (coronary artery disease)  Assessment and Plan of Treatment: You had past history of CAD (coronary artery disease)  Continue with home medication and follow up with your PCP

## 2021-01-28 NOTE — PROGRESS NOTE ADULT - SUBJECTIVE AND OBJECTIVE BOX
PGY-1 Progress Note discussed with attending    PAGER #: [[phone]] TILL 5:00 PM  PLEASE CONTACT ON CALL TEAM:  - On Call Team (Please refer to Trinh) FROM 5:00 PM - 8:30PM  - Nightfloat Team FROM 8:30 -7:30 AM    CHIEF COMPLAINT & BRIEF HOSPITAL COURSE:    INTERVAL HPI/OVERNIGHT EVENTS:       REVIEW OF SYSTEMS:  CONSTITUTIONAL: No fever, weight loss, or fatigue  RESPIRATORY: No cough, wheezing, chills or hemoptysis; No shortness of breath  CARDIOVASCULAR: No chest pain, palpitations, dizziness, or leg swelling  GASTROINTESTINAL: No abdominal pain. No nausea, vomiting, or hematemesis; No diarrhea or constipation. No melena or hematochezia.  GENITOURINARY: No dysuria or hematuria, urinary frequency  NEUROLOGICAL: No headaches, memory loss, loss of strength, numbness, or tremors  SKIN: No itching, burning, rashes, or lesions     MEDICATIONS  (STANDING):  ascorbic acid 500 milliGRAM(s) Oral daily  aspirin  chewable 81 milliGRAM(s) Oral daily  atorvastatin 40 milliGRAM(s) Oral at bedtime  azithromycin  IVPB 500 milliGRAM(s) IV Intermittent every 24 hours  clopidogrel Tablet 75 milliGRAM(s) Oral daily  dexAMETHasone  Injectable 6 milliGRAM(s) IV Push daily  enoxaparin Injectable 40 milliGRAM(s) SubCutaneous daily  ergocalciferol 56405 Unit(s) Oral once  insulin glargine Injectable (LANTUS) 30 Unit(s) SubCutaneous at bedtime  insulin lispro (ADMELOG) corrective regimen sliding scale   SubCutaneous Before meals and at bedtime  zinc sulfate 220 milliGRAM(s) Oral daily    MEDICATIONS  (PRN):  ALBUTerol    90 MICROgram(s) HFA Inhaler 2 Puff(s) Inhalation every 6 hours PRN Bronchospasm      Vital Signs Last 24 Hrs  T(C): 36.4 (28 Jan 2021 08:06), Max: 37.4 (27 Jan 2021 19:31)  T(F): 97.5 (28 Jan 2021 08:06), Max: 99.3 (27 Jan 2021 19:31)  HR: 81 (28 Jan 2021 08:06) (81 - 106)  BP: 146/63 (28 Jan 2021 08:06) (103/64 - 146/63)  BP(mean): --  RR: 18 (28 Jan 2021 08:06) (18 - 22)  SpO2: 93% (28 Jan 2021 08:06) (93% - 97%)    PHYSICAL EXAMINATION:  GENERAL: NAD, well built  HEAD:  Atraumatic, Normocephalic  EYES:  conjunctiva and sclera clear  NECK: Supple, No JVD, Normal thyroid  CHEST/LUNG: Clear to auscultation. Clear to percussion bilaterally; No rales, rhonchi, wheezing, or rubs  HEART: Regular rate and rhythm; No murmurs, rubs, or gallops  ABDOMEN: Soft, Nontender, Nondistended; Bowel sounds present  NERVOUS SYSTEM:  Alert & Oriented X3,    EXTREMITIES:  2+ Peripheral Pulses, No clubbing, cyanosis, or edema  SKIN: warm dry                          12.6   3.02  )-----------( 181      ( 28 Jan 2021 08:42 )             38.8     01-27    132<L>  |  100  |  14  ----------------------------<  280<H>  3.9   |  26  |  0.66    Ca    9.5      27 Jan 2021 16:59    TPro  7.1  /  Alb  2.7<L>  /  TBili  0.4  /  DBili  x   /  AST  31  /  ALT  71<H>  /  AlkPhos  94  01-27    LIVER FUNCTIONS - ( 27 Jan 2021 16:59 )  Alb: 2.7 g/dL / Pro: 7.1 g/dL / ALK PHOS: 94 U/L / ALT: 71 U/L DA / AST: 31 U/L / GGT: x                       CAPILLARY BLOOD GLUCOSE  CAPILLARY BLOOD GLUCOSE      POCT Blood Glucose.: 280 mg/dL (28 Jan 2021 07:35)    CAPILLARY BLOOD GLUCOSE      POCT Blood Glucose.: 280 mg/dL (28 Jan 2021 07:35)  POCT Blood Glucose.: 294 mg/dL (28 Jan 2021 06:05)  POCT Blood Glucose.: 325 mg/dL (28 Jan 2021 02:39)  POCT Blood Glucose.: 405 mg/dL (28 Jan 2021 00:16)  POCT Blood Glucose.: 362 mg/dL (27 Jan 2021 21:46)  POCT Blood Glucose.: 330 mg/dL (27 Jan 2021 20:45)      RADIOLOGY & ADDITIONAL TESTS:                   PGY-1 Progress Note discussed with attending    PAGER #: [[phone]] TILL 5:00 PM  PLEASE CONTACT ON CALL TEAM:  - On Call Team (Please refer to Trinh) FROM 5:00 PM - 8:30PM  - Nightfloat Team FROM 8:30 -7:30 AM        INTERVAL HPI/OVERNIGHT EVENTS:   patient examined bed side, she is comfortable , not in distress, c/o cough and diarrhea , she is saturating 93% on 3 L NC, on day 1 decadron , will f/u covid Ab , patient is also on azithro for CAP     REVIEW OF SYSTEMS:  CONSTITUTIONAL: No fever, weight loss, or fatigue  RESPIRATORY: cough  CARDIOVASCULAR: No chest pain, palpitations, dizziness, or leg swelling  GASTROINTESTINAL: diarrhea  GENITOURINARY: No dysuria or hematuria, urinary frequency  NEUROLOGICAL: No headaches, memory loss, loss of strength, numbness, or tremors  SKIN: No itching, burning, rashes, or lesions     MEDICATIONS  (STANDING):  ascorbic acid 500 milliGRAM(s) Oral daily  aspirin  chewable 81 milliGRAM(s) Oral daily  atorvastatin 40 milliGRAM(s) Oral at bedtime  azithromycin  IVPB 500 milliGRAM(s) IV Intermittent every 24 hours  clopidogrel Tablet 75 milliGRAM(s) Oral daily  dexAMETHasone  Injectable 6 milliGRAM(s) IV Push daily  enoxaparin Injectable 40 milliGRAM(s) SubCutaneous daily  ergocalciferol 69645 Unit(s) Oral once  insulin glargine Injectable (LANTUS) 30 Unit(s) SubCutaneous at bedtime  insulin lispro (ADMELOG) corrective regimen sliding scale   SubCutaneous Before meals and at bedtime  zinc sulfate 220 milliGRAM(s) Oral daily    MEDICATIONS  (PRN):  ALBUTerol    90 MICROgram(s) HFA Inhaler 2 Puff(s) Inhalation every 6 hours PRN Bronchospasm      Vital Signs Last 24 Hrs  T(C): 36.4 (28 Jan 2021 08:06), Max: 37.4 (27 Jan 2021 19:31)  T(F): 97.5 (28 Jan 2021 08:06), Max: 99.3 (27 Jan 2021 19:31)  HR: 81 (28 Jan 2021 08:06) (81 - 106)  BP: 146/63 (28 Jan 2021 08:06) (103/64 - 146/63)  BP(mean): --  RR: 18 (28 Jan 2021 08:06) (18 - 22)  SpO2: 93% (28 Jan 2021 08:06) (93% - 97%)    PHYSICAL EXAMINATION:  GENERAL: NAD, on NC   HEAD:  Atraumatic, Normocephalic  EYES:  conjunctiva and sclera clear  NECK: Supple, No JVD, Normal thyroid  CHEST/LUNG: Clear to auscultation. Clear to percussion bilaterally; No rales, rhonchi, wheezing, or rubs  HEART: Regular rate and rhythm; No murmurs, rubs, or gallops  ABDOMEN: Soft, Nontender, Nondistended; Bowel sounds present  NERVOUS SYSTEM:  Alert & Oriented X3,    EXTREMITIES:  2+ Peripheral Pulses, No clubbing, cyanosis, or edema  SKIN: warm dry                          12.6   3.02  )-----------( 181      ( 28 Jan 2021 08:42 )             38.8     01-27    132<L>  |  100  |  14  ----------------------------<  280<H>  3.9   |  26  |  0.66    Ca    9.5      27 Jan 2021 16:59    TPro  7.1  /  Alb  2.7<L>  /  TBili  0.4  /  DBili  x   /  AST  31  /  ALT  71<H>  /  AlkPhos  94  01-27    LIVER FUNCTIONS - ( 27 Jan 2021 16:59 )  Alb: 2.7 g/dL / Pro: 7.1 g/dL / ALK PHOS: 94 U/L / ALT: 71 U/L DA / AST: 31 U/L / GGT: x                       CAPILLARY BLOOD GLUCOSE  CAPILLARY BLOOD GLUCOSE      POCT Blood Glucose.: 280 mg/dL (28 Jan 2021 07:35)    CAPILLARY BLOOD GLUCOSE      POCT Blood Glucose.: 280 mg/dL (28 Jan 2021 07:35)  POCT Blood Glucose.: 294 mg/dL (28 Jan 2021 06:05)  POCT Blood Glucose.: 325 mg/dL (28 Jan 2021 02:39)  POCT Blood Glucose.: 405 mg/dL (28 Jan 2021 00:16)  POCT Blood Glucose.: 362 mg/dL (27 Jan 2021 21:46)  POCT Blood Glucose.: 330 mg/dL (27 Jan 2021 20:45)      RADIOLOGY & ADDITIONAL TESTS:

## 2021-01-28 NOTE — CONSULT NOTE ADULT - PROBLEM SELECTOR RECOMMENDATION 9
isolation precautions  oxygen supp  monitor oxygen sat  check LDH, LFTs, CRP, D-Dimer, Ferritin and procalcitonin  vit C, D and zinc supp  Bronchodilators

## 2021-01-28 NOTE — DISCHARGE NOTE PROVIDER - HOSPITAL COURSE
55 year old female, post menopausal, with PMHx of NIDDM and HTN presenting with fever, chills, and myalgias and diagnosis of COVID 19 since 1/19. Pt states she started having dry cough 3 days ago and worsening of chills. Pt also had a few episodes of diarrhea. Patient endorses loss of smell and loss of appetite but denies chest pain, or any other acute complaints.Patient was found to be COVID positive and was started on supportive measures and decadron , Covid AB came back >> . patient was also started on azithromycin for community acquired pneumonia treatment.

## 2021-01-28 NOTE — CONSULT NOTE ADULT - SUBJECTIVE AND OBJECTIVE BOX
PULMONARY CONSULT NOTE      ELYSIA JULIAN  MRN-[id number]    Patient is a 55y old  Female who presents with a chief complaint of fever chills (2021 08:54)  55 year old female, post menopausal, with PMHx of NIDDM and HTN presenting with fever, chills, and myalgias and diagnosis of COVID 19 since . Pt states she started having dry cough 3 days ago and worsening of chills. Pt also had a few episodes of diarrhea. Patient endorses loss of smell and loss of appetite but denies chest pain, or any other acute complaints.    HISTORY OF PRESENT ILLNESS: As above. Awake, alert, comfortable in bed in NAD    MEDICATIONS  (STANDING):  ascorbic acid 500 milliGRAM(s) Oral daily  aspirin  chewable 81 milliGRAM(s) Oral daily  atorvastatin 40 milliGRAM(s) Oral at bedtime  azithromycin  IVPB 500 milliGRAM(s) IV Intermittent every 24 hours  clopidogrel Tablet 75 milliGRAM(s) Oral daily  dexAMETHasone  Injectable 6 milliGRAM(s) IV Push daily  dextrose 40% Gel 15 Gram(s) Oral once  dextrose 5%. 1000 milliLiter(s) (50 mL/Hr) IV Continuous <Continuous>  dextrose 5%. 1000 milliLiter(s) (100 mL/Hr) IV Continuous <Continuous>  enoxaparin Injectable 40 milliGRAM(s) SubCutaneous daily  ergocalciferol 04372 Unit(s) Oral once  glucagon  Injectable 1 milliGRAM(s) IntraMuscular once  insulin glargine Injectable (LANTUS) 35 Unit(s) SubCutaneous at bedtime  insulin lispro (ADMELOG) corrective regimen sliding scale   SubCutaneous Before meals and at bedtime  valsartan 160 milliGRAM(s) Oral daily  zinc sulfate 220 milliGRAM(s) Oral daily      MEDICATIONS  (PRN):  ALBUTerol    90 MICROgram(s) HFA Inhaler 2 Puff(s) Inhalation every 6 hours PRN Bronchospasm  guaiFENesin   Syrup  (Sugar-Free) 200 milliGRAM(s) Oral every 6 hours PRN Cough      Allergies    Cipro (Short breath)  penicillin (Rash)    Intolerances        PAST MEDICAL & SURGICAL HISTORY:  CAD (coronary artery disease)    Fibromyalgia    Hypertension    Diabetes    S/P     S/P cholecystectomy    S/P appendectomy    S/P tonsillectomy        FAMILY HISTORY:  Family history of cardiac arrest    Family history of stroke        SOCIAL HISTORY  Smoking History:     REVIEW OF SYSTEMS:    CONSTITUTIONAL:  No fevers, chills, sweats    HEENT:  Eyes:  No diplopia or blurred vision. ENT:  No earache, sore throat or runny nose.    CARDIOVASCULAR:  No pressure, squeezing, tightness, or heaviness about the chest; no palpitations.    RESPIRATORY:  Per HPI    GASTROINTESTINAL:  No abdominal pain, nausea, vomiting or diarrhea.    GENITOURINARY:  No dysuria, frequency or urgency.    NEUROLOGIC:  No paresthesias, fasciculations, seizures or weakness.    PSYCHIATRIC:  No disorder of thought or mood.    Vital Signs Last 24 Hrs  T(C): 36.4 (2021 08:06), Max: 37.4 (2021 19:31)  T(F): 97.5 (2021 08:06), Max: 99.3 (2021 19:31)  HR: 81 (2021 08:06) (81 - 106)  BP: 146/63 (2021 08:06) (103/64 - 146/63)  BP(mean): --  RR: 18 (2021 08:06) (18 - 22)  SpO2: 93% (2021 08:06) (93% - 97%)  I&O's Detail      PHYSICAL EXAMINATION:    GENERAL: The patient is a well-developed, well-nourished _____in no apparent distress.     HEENT: Head is normocephalic and atraumatic. Extraocular muscles are intact. Mucous membranes are moist.     NECK: Supple.     LUNGS: Clear to auscultation without wheezing, rales, or rhonchi. Respirations unlabored    HEART: Regular rate and rhythm without murmur.    ABDOMEN: Soft, nontender, and nondistended.  No hepatosplenomegaly is noted.    EXTREMITIES: Without any cyanosis, clubbing, rash, lesions or edema.    NEUROLOGIC: Grossly intact.      LABS:                        12.6   3.02  )-----------( 181      ( 2021 08:42 )             38.8         135  |  101  |  19<H>  ----------------------------<  299<H>  4.3   |  26  |  0.58    Ca    9.8      2021 08:42  Phos  2.7       Mg     2.2         TPro  7.1  /  Alb  2.7<L>  /  TBili  0.4  /  DBili  x   /  AST  31  /  ALT  71<H>  /  AlkPhos  94      COVID-19 PCR: Detected: You can help in the fight against COVID-19. [facility] may contact   you to see if you are interested in voluntarily participating in one of   our clinical trials.   This test has been validated by [facility] to be accurate;   though it has not been FDA cleared/approved by the usual pathway   As with all laboratory test, results should be correlated with clinical   findings.   https://www.fda.gov/media/122663/download   https://www.fda.gov/media/215412/download (21 @ 16:57)       CARDIAC MARKERS ( 2021 08:42 )  <0.015 ng/mL / x     / 45 U/L / x     / x          D-Dimer Assay, Quantitative: 433 ng/mL DDU (21 @ 16:59)      Lactate, Blood: 1.3 mmol/L (21 @ 16:59)    Procalcitonin, Serum: 0.13 ng/mL (21 @ 22:52)      MICROBIOLOGY:    RADIOLOGY & ADDITIONAL STUDIES:    CXR:  < from: Xray Chest 1 View-PORTABLE IMMEDIATE (21 @ 17:00) >  IMPRESSION:   RIGHT lung base linear atelectasis/airspace disease..    < end of copied text >    Ct scan chest:    ekg;    echo:

## 2021-01-28 NOTE — PROVIDER CONTACT NOTE (OTHER) - ASSESSMENT
pt no c/o CP, palps. pt feels tired, weak, lightheaded when walking at baseline for past few days; VSS; callbell within reach

## 2021-01-28 NOTE — PROGRESS NOTE ADULT - PROBLEM SELECTOR PLAN 3
at home on valsartan will hold BP medications as bp is soft   resume as indicated at home on valsartan  resumed w parameters  DASh diet

## 2021-01-28 NOTE — DISCHARGE NOTE PROVIDER - NSDCMRMEDTOKEN_GEN_ALL_CORE_FT
aspirin 81 mg oral delayed release tablet: 1 tab(s) orally once a day  clopidogrel 75 mg oral tablet: 1 tab(s) orally once a day  Levemir FlexPen 100 units/mL subcutaneous solution: 30 unit(s) subcutaneous once a day (at bedtime)  Lipitor 40 mg oral tablet: 1 tab(s) orally once a day   Metoprolol Succinate ER 50 mg oral tablet, extended release: 1 tab(s) orally once a day   valsartan 160 mg oral tablet: 1 tab(s) orally once a day   albuterol 90 mcg/inh inhalation aerosol: 2 puff(s) inhaled every 6 hours, As needed, Bronchospasm  ascorbic acid 1000 mg oral tablet: 1 tab(s) orally once a day  aspirin 81 mg oral delayed release tablet: 1 tab(s) orally once a day  azithromycin 500 mg oral tablet: 1 tab(s) orally every 24 hours   cholecalciferol oral tablet: 1 tab(s) orally every 24 hours   clopidogrel 75 mg oral tablet: 1 tab(s) orally once a day  dexamethasone 2 mg oral tablet: 3 tab(s) orally once a day   famotidine 40 mg oral tablet: 1 tab(s) orally once a day   Levemir FlexPen 100 units/mL subcutaneous solution: 30 unit(s) subcutaneous once a day (at bedtime)  Lipitor 40 mg oral tablet: 1 tab(s) orally once a day   Metoprolol Succinate ER 50 mg oral tablet, extended release: 1 tab(s) orally once a day   montelukast 10 mg oral tablet: 1 tab(s) orally every 24 hours  NovoLOG 100 units/mL injectable solution: 5 unit(s) injectable 3 times a day (before meals) standing +  add more units ( when you are on decadrone)  as follow  1 Unit(s) if Glucose 151 - 200  2 Unit(s) if Glucose 201 - 250  3 Unit(s) if Glucose 251 - 300  4 Unit(s) if Glucose 301 - 350  5 Unit(s) if Glucose 351 - 400  6 Unit(s) if Glucose Greater Than 400    senna oral tablet: 2 tab(s) orally once a day (at bedtime)  valsartan 160 mg oral tablet: 1 tab(s) orally once a day  Zinc-220 oral capsule: 1 cap(s) orally once a day

## 2021-01-28 NOTE — PROVIDER CONTACT NOTE (OTHER) - RECOMMENDATIONS
administer additional supplemental insulin. notify of any changes; recheck blood sugars q6 until hyperglycemia corrects itself

## 2021-01-29 LAB
ALBUMIN SERPL ELPH-MCNC: 2.6 G/DL — LOW (ref 3.5–5)
ALP SERPL-CCNC: 82 U/L — SIGNIFICANT CHANGE UP (ref 40–120)
ALT FLD-CCNC: 58 U/L DA — SIGNIFICANT CHANGE UP (ref 10–60)
ANION GAP SERPL CALC-SCNC: 5 MMOL/L — SIGNIFICANT CHANGE UP (ref 5–17)
AST SERPL-CCNC: 18 U/L — SIGNIFICANT CHANGE UP (ref 10–40)
BILIRUB SERPL-MCNC: 0.3 MG/DL — SIGNIFICANT CHANGE UP (ref 0.2–1.2)
BUN SERPL-MCNC: 18 MG/DL — SIGNIFICANT CHANGE UP (ref 7–18)
CALCIUM SERPL-MCNC: 10 MG/DL — SIGNIFICANT CHANGE UP (ref 8.4–10.5)
CHLORIDE SERPL-SCNC: 103 MMOL/L — SIGNIFICANT CHANGE UP (ref 96–108)
CO2 SERPL-SCNC: 28 MMOL/L — SIGNIFICANT CHANGE UP (ref 22–31)
CREAT SERPL-MCNC: 0.61 MG/DL — SIGNIFICANT CHANGE UP (ref 0.5–1.3)
GLUCOSE BLDC GLUCOMTR-MCNC: 278 MG/DL — HIGH (ref 70–99)
GLUCOSE BLDC GLUCOMTR-MCNC: 282 MG/DL — HIGH (ref 70–99)
GLUCOSE BLDC GLUCOMTR-MCNC: 313 MG/DL — HIGH (ref 70–99)
GLUCOSE BLDC GLUCOMTR-MCNC: 341 MG/DL — HIGH (ref 70–99)
GLUCOSE SERPL-MCNC: 304 MG/DL — HIGH (ref 70–99)
HCT VFR BLD CALC: 37.6 % — SIGNIFICANT CHANGE UP (ref 34.5–45)
HGB BLD-MCNC: 12.2 G/DL — SIGNIFICANT CHANGE UP (ref 11.5–15.5)
MAGNESIUM SERPL-MCNC: 2.2 MG/DL — SIGNIFICANT CHANGE UP (ref 1.6–2.6)
MCHC RBC-ENTMCNC: 29.8 PG — SIGNIFICANT CHANGE UP (ref 27–34)
MCHC RBC-ENTMCNC: 32.4 GM/DL — SIGNIFICANT CHANGE UP (ref 32–36)
MCV RBC AUTO: 91.9 FL — SIGNIFICANT CHANGE UP (ref 80–100)
NRBC # BLD: 0 /100 WBCS — SIGNIFICANT CHANGE UP (ref 0–0)
PHOSPHATE SERPL-MCNC: 2.5 MG/DL — SIGNIFICANT CHANGE UP (ref 2.5–4.5)
PLATELET # BLD AUTO: 221 K/UL — SIGNIFICANT CHANGE UP (ref 150–400)
POTASSIUM SERPL-MCNC: 4.4 MMOL/L — SIGNIFICANT CHANGE UP (ref 3.5–5.3)
POTASSIUM SERPL-SCNC: 4.4 MMOL/L — SIGNIFICANT CHANGE UP (ref 3.5–5.3)
PROT SERPL-MCNC: 6.8 G/DL — SIGNIFICANT CHANGE UP (ref 6–8.3)
RBC # BLD: 4.09 M/UL — SIGNIFICANT CHANGE UP (ref 3.8–5.2)
RBC # FLD: 12.1 % — SIGNIFICANT CHANGE UP (ref 10.3–14.5)
SODIUM SERPL-SCNC: 136 MMOL/L — SIGNIFICANT CHANGE UP (ref 135–145)
WBC # BLD: 4.92 K/UL — SIGNIFICANT CHANGE UP (ref 3.8–10.5)
WBC # FLD AUTO: 4.92 K/UL — SIGNIFICANT CHANGE UP (ref 3.8–10.5)

## 2021-01-29 RX ORDER — CHOLECALCIFEROL (VITAMIN D3) 125 MCG
2000 CAPSULE ORAL EVERY 24 HOURS
Refills: 0 | Status: DISCONTINUED | OUTPATIENT
Start: 2021-01-29 | End: 2021-01-31

## 2021-01-29 RX ORDER — INSULIN LISPRO 100/ML
5 VIAL (ML) SUBCUTANEOUS
Refills: 0 | Status: DISCONTINUED | OUTPATIENT
Start: 2021-01-29 | End: 2021-01-29

## 2021-01-29 RX ORDER — SODIUM CHLORIDE 0.65 %
1 AEROSOL, SPRAY (ML) NASAL
Refills: 0 | Status: DISCONTINUED | OUTPATIENT
Start: 2021-01-29 | End: 2021-01-31

## 2021-01-29 RX ORDER — INSULIN LISPRO 100/ML
10 VIAL (ML) SUBCUTANEOUS
Refills: 0 | Status: DISCONTINUED | OUTPATIENT
Start: 2021-01-29 | End: 2021-01-31

## 2021-01-29 RX ORDER — FAMOTIDINE 10 MG/ML
40 INJECTION INTRAVENOUS
Refills: 0 | Status: DISCONTINUED | OUTPATIENT
Start: 2021-01-29 | End: 2021-01-31

## 2021-01-29 RX ORDER — MONTELUKAST 4 MG/1
10 TABLET, CHEWABLE ORAL EVERY 24 HOURS
Refills: 0 | Status: DISCONTINUED | OUTPATIENT
Start: 2021-01-29 | End: 2021-01-31

## 2021-01-29 RX ORDER — SENNA PLUS 8.6 MG/1
2 TABLET ORAL AT BEDTIME
Refills: 0 | Status: DISCONTINUED | OUTPATIENT
Start: 2021-01-29 | End: 2021-01-31

## 2021-01-29 RX ORDER — ASCORBIC ACID 60 MG
1000 TABLET,CHEWABLE ORAL DAILY
Refills: 0 | Status: DISCONTINUED | OUTPATIENT
Start: 2021-01-29 | End: 2021-01-31

## 2021-01-29 RX ORDER — POLYETHYLENE GLYCOL 3350 17 G/17G
17 POWDER, FOR SOLUTION ORAL ONCE
Refills: 0 | Status: COMPLETED | OUTPATIENT
Start: 2021-01-29 | End: 2021-01-29

## 2021-01-29 RX ORDER — INSULIN GLARGINE 100 [IU]/ML
45 INJECTION, SOLUTION SUBCUTANEOUS AT BEDTIME
Refills: 0 | Status: DISCONTINUED | OUTPATIENT
Start: 2021-01-29 | End: 2021-01-31

## 2021-01-29 RX ADMIN — ENOXAPARIN SODIUM 40 MILLIGRAM(S): 100 INJECTION SUBCUTANEOUS at 10:56

## 2021-01-29 RX ADMIN — VALSARTAN 160 MILLIGRAM(S): 80 TABLET ORAL at 05:34

## 2021-01-29 RX ADMIN — Medication 1000 MILLIGRAM(S): at 10:56

## 2021-01-29 RX ADMIN — Medication 1 SPRAY(S): at 16:53

## 2021-01-29 RX ADMIN — INSULIN GLARGINE 45 UNIT(S): 100 INJECTION, SOLUTION SUBCUTANEOUS at 21:15

## 2021-01-29 RX ADMIN — MONTELUKAST 10 MILLIGRAM(S): 4 TABLET, CHEWABLE ORAL at 10:56

## 2021-01-29 RX ADMIN — SENNA PLUS 2 TABLET(S): 8.6 TABLET ORAL at 21:14

## 2021-01-29 RX ADMIN — Medication 2000 UNIT(S): at 10:56

## 2021-01-29 RX ADMIN — FAMOTIDINE 40 MILLIGRAM(S): 10 INJECTION INTRAVENOUS at 16:54

## 2021-01-29 RX ADMIN — Medication 5 UNIT(S): at 11:22

## 2021-01-29 RX ADMIN — Medication 3: at 08:01

## 2021-01-29 RX ADMIN — Medication 3: at 16:53

## 2021-01-29 RX ADMIN — Medication 6 MILLIGRAM(S): at 05:34

## 2021-01-29 RX ADMIN — Medication 2 UNIT(S): at 08:01

## 2021-01-29 RX ADMIN — AZITHROMYCIN 255 MILLIGRAM(S): 500 TABLET, FILM COATED ORAL at 07:40

## 2021-01-29 RX ADMIN — ATORVASTATIN CALCIUM 40 MILLIGRAM(S): 80 TABLET, FILM COATED ORAL at 21:14

## 2021-01-29 RX ADMIN — Medication 4: at 11:22

## 2021-01-29 RX ADMIN — Medication 81 MILLIGRAM(S): at 10:56

## 2021-01-29 RX ADMIN — CLOPIDOGREL BISULFATE 75 MILLIGRAM(S): 75 TABLET, FILM COATED ORAL at 10:56

## 2021-01-29 RX ADMIN — Medication 10 UNIT(S): at 16:53

## 2021-01-29 RX ADMIN — ZINC SULFATE TAB 220 MG (50 MG ZINC EQUIVALENT) 220 MILLIGRAM(S): 220 (50 ZN) TAB at 10:56

## 2021-01-29 RX ADMIN — Medication 4: at 21:15

## 2021-01-29 NOTE — PROGRESS NOTE ADULT - ASSESSMENT
Problem/Recommendation - 1:  Problem: COVID-19. Recommendation: isolation precautions  oxygen supp - taper as feasible.  monitor oxygen sat  check LDH, LFTs, CRP, D-Dimer, Ferritin and procalcitonin  vit C, D and zinc supp  continue dexamethasone.  Bronchodilators.    Problem/Recommendation - 2:  ·  Problem: Hypertension.  Recommendation: monitor BP  cont meds.     Problem/Recommendation - 3:  ·  Problem: Diabetes.  Recommendation: Accuchecks with reg insulin coverage  HBA1C.     Problem/Recommendation - 4:  ·  Problem: Fibromyalgia.  Recommendation: cont meds  Rheum follow up as OP.  Problem/Recommendation - 1:  Problem: COVID-19. Recommendation: isolation precautions  oxygen supp - taper as feasible.  monitor oxygen sat  check LDH, LFTs, CRP, D-Dimer, Ferritin and procalcitonin  vit C, D and zinc supp  continue dexamethasone.  Start remdesivir x 5 days   Bronchodilators.  ID F/U   DVT and GI PPX     Problem/Recommendation - 2:  ·  Problem: Hypertension.  Recommendation: monitor BP  cont meds.     Problem/Recommendation - 3:  ·  Problem: Diabetes.  Recommendation: Accuchecks with reg insulin coverage  HBA1C.     Problem/Recommendation - 4:  ·  Problem: Fibromyalgia.  Recommendation: cont meds  Rheum follow up as OP.

## 2021-01-29 NOTE — PROGRESS NOTE ADULT - PROBLEM SELECTOR PLAN 1
p/w SOB, cough   - WBC: WNL, lymphocyte; WNl   - started on decadron day 1    start remdesivir if antibody is neg  - CXR RLL infiltrate  - Will c/w  Azithro to cover for CAp.   - contact and airborne isolation precaution   - fu LDH, Procalcitonin, ferritin   - fu Blood culture   - Tylenol, Albuterol Inhaler for sxs tx p/w SOB, cough   - WBC: WNL, lymphocyte; WNl   -  on decadron day 2  covid Ab pos so did not start remdsvir  - CXR RLL infiltrate  - Will c/w  Azithro to cover for CAp.   - contact and airborne isolation precaution   - fu LDH, Procalcitonin, ferritin   - Blood culture ne till date  - Tylenol, Albuterol Inhaler for sxs tx

## 2021-01-29 NOTE — PROGRESS NOTE ADULT - PROBLEM SELECTOR PLAN 2
fu a1c   BG running high > 300-400   CW hss as pt is on Decadron   continue with home medications a1c 9.6  BG running high > 300-400   endo consulted

## 2021-01-29 NOTE — CONSULT NOTE ADULT - ASSESSMENT
55 year old female, post menopausal, with PMHx of NIDDM and HTN admitted to medical service for covid pneumonia, now on decadron. Endo consult on board for uncontrolled DM.     
Patient is a 55y old  Female who is post menopausal, with PMHx of NIDDM and HTN, presents to the ER for evaluation of fever, chills, and myalgias and diagnosed with COVID 19 since 1/19/21. She started having dry cough, worsening chills, and a few episodes of diarrhea. Patient endorses loss of smell and loss of appetite but denies chest pain, or any other acute complaints. On admission, she found to have low grade fever, tachycardia and tachypnea with hypoxia, and now saturating well on supplemental oxygenation via NC. She has started on Dexamethasone and the ID consult requested to assist with further management.       # Acute Respiratory failure - most  likely due to COVID -19  # SARS COVID 19    would recommend:    1. Please start on Remdesivir 200 mg X 1 dose -Day 1 and Day 2 to 5 100 mg daily  2. Continue Dexamethasone to complete the course OR until discharge   3. Supplemental oxygenation and bronchodilator as needed  4. Continue supportive care  5. COVID precautions     d/w patient and Nursing staff     will follow the patient with you and make further recommendation based on the clinical course and Lab results  Thank you for the opportunity to participate in Ms. JULIAN's care      Attending Attestation:    Spent more than 65 minutes on total encounter, more than 50 % of the visit was spent counseling and/or coordinating care by the Attending physician.

## 2021-01-29 NOTE — PROGRESS NOTE ADULT - SUBJECTIVE AND OBJECTIVE BOX
Patient is a 55y old  Female who presents with a chief complaint of fever chills (28 Jan 2021 22:45)    pt seen in icu [  ], reg med floor [ x  ], bed [ x ], chair at bedside [   ], a+o x3 [ x ], lethargic [  ],    nad [x  ]      Allergies    Cipro (Short breath)  penicillin (Rash)        Vitals    T(F): 97.8 (01-29-21 @ 00:27), Max: 98.8 (01-28-21 @ 16:23)  HR: 73 (01-29-21 @ 04:59) (70 - 85)  BP: 142/63 (01-29-21 @ 04:59) (134/79 - 146/63)  RR: 17 (01-29-21 @ 04:59) (17 - 18)  SpO2: 91% (01-29-21 @ 04:59) (91% - 98%)  Wt(kg): --  CAPILLARY BLOOD GLUCOSE      POCT Blood Glucose.: 288 mg/dL (28 Jan 2021 21:16)      Labs                          12.6   3.02  )-----------( 181      ( 28 Jan 2021 08:42 )             38.8       01-28    135  |  101  |  19<H>  ----------------------------<  299<H>  4.3   |  26  |  0.58    Ca    9.8      28 Jan 2021 08:42  Phos  2.7     01-28  Mg     2.2     01-28    TPro  7.1  /  Alb  2.7<L>  /  TBili  0.4  /  DBili  x   /  AST  31  /  ALT  71<H>  /  AlkPhos  94  01-27      CARDIAC MARKERS ( 28 Jan 2021 08:42 )  <0.015 ng/mL / x     / 45 U/L / x     / x            .Blood Blood-Peripheral  01-27 @ 22:01   No growth to date.  --  --          Radiology Results      Meds    MEDICATIONS  (STANDING):  ascorbic acid 500 milliGRAM(s) Oral daily  aspirin  chewable 81 milliGRAM(s) Oral daily  atorvastatin 40 milliGRAM(s) Oral at bedtime  azithromycin  IVPB 500 milliGRAM(s) IV Intermittent every 24 hours  clopidogrel Tablet 75 milliGRAM(s) Oral daily  dexAMETHasone  Injectable 6 milliGRAM(s) IV Push daily  dextrose 40% Gel 15 Gram(s) Oral once  dextrose 5%. 1000 milliLiter(s) (50 mL/Hr) IV Continuous <Continuous>  dextrose 5%. 1000 milliLiter(s) (100 mL/Hr) IV Continuous <Continuous>  enoxaparin Injectable 40 milliGRAM(s) SubCutaneous daily  glucagon  Injectable 1 milliGRAM(s) IntraMuscular once  insulin glargine Injectable (LANTUS) 35 Unit(s) SubCutaneous at bedtime  insulin lispro (ADMELOG) corrective regimen sliding scale   SubCutaneous Before meals and at bedtime  insulin lispro Injectable (ADMELOG) 2 Unit(s) SubCutaneous three times a day before meals  valsartan 160 milliGRAM(s) Oral daily  zinc sulfate 220 milliGRAM(s) Oral daily      MEDICATIONS  (PRN):  ALBUTerol    90 MICROgram(s) HFA Inhaler 2 Puff(s) Inhalation every 6 hours PRN Bronchospasm  guaiFENesin   Syrup  (Sugar-Free) 200 milliGRAM(s) Oral every 6 hours PRN Cough      Physical Exam    Neuro :  no focal deficits  Respiratory: CTA B/L  CV: RRR, S1S2, no murmurs,   Abdominal: Soft, NT, ND +BS,  Extremities: No edema, + peripheral pulses    ASSESSMENT    right lower lobe pneumonia possibly bacterial,   covid 19 infection,   h/o NIDDM,   HTN,   CAD (coronary artery disease)  Fibromyalgia  cholecystectomy  appendectomy  tonsillectomy        PLAN    cont zithromax,   id cons  f/u blood and ucx  remdesivir if covid ab neg   cont decadron,   cont vit c, vitamin d, zinc, pepcid, singulair,   contact and airborne isolation,   pulm cons   cont albuterol inhaler,   tmx 99.3   cont tylenol prn,   cont robitussin prn   O2 sat   (94% - 97%) on n/c 2L  O2 via nasal canula as needed  f/u covid-19 antibody test,   f/u procalcitonin, D-dimer, esr, crp, ldh, ferritin, lactate,   hgba1c  cont current meds         Patient is a 55y old  Female who presents with a chief complaint of fever chills (28 Jan 2021 22:45)    pt seen in icu [  ], reg med floor [ x  ], bed [ x ], chair at bedside [   ], a+o x3 [ x ], lethargic [  ],    nad [x  ]      Allergies    Cipro (Short breath)  penicillin (Rash)        Vitals    T(F): 97.8 (01-29-21 @ 00:27), Max: 98.8 (01-28-21 @ 16:23)  HR: 73 (01-29-21 @ 04:59) (70 - 85)  BP: 142/63 (01-29-21 @ 04:59) (134/79 - 146/63)  RR: 17 (01-29-21 @ 04:59) (17 - 18)  SpO2: 91% (01-29-21 @ 04:59) (91% - 98%)  Wt(kg): --  CAPILLARY BLOOD GLUCOSE      POCT Blood Glucose.: 288 mg/dL (28 Jan 2021 21:16)      Labs                          12.6   3.02  )-----------( 181      ( 28 Jan 2021 08:42 )             38.8       01-28    135  |  101  |  19<H>  ----------------------------<  299<H>  4.3   |  26  |  0.58    Ca    9.8      28 Jan 2021 08:42  Phos  2.7     01-28  Mg     2.2     01-28    TPro  7.1  /  Alb  2.7<L>  /  TBili  0.4  /  DBili  x   /  AST  31  /  ALT  71<H>  /  AlkPhos  94  01-27    Procalcitonin, Serum (01.27.21 @ 22:52)   Procalcitonin, Serum: 0.13  C-Reactive Protein, Serum (01.27.21 @ 22:52)   C-Reactive Protein, Serum: 16.22 mg/dL  D-Dimer Assay, Quantitative (01.27.21 @ 16:59)   D-Dimer Assay, Quantitative: 433 ng/mL  Ferritin, Serum (01.28.21 @ 13:41)   Ferritin, Serum: 545 ng/mL   Lactate Dehydrogenase, Serum (01.28.21 @ 08:42)   Lactate Dehydrogenase, Serum: 223 U/L   Lactate, Blood (01.27.21 @ 16:59)   Lactate, Blood: 1.3 mmol/L   Sedimentation Rate, Erythrocyte (01.28.21 @ 08:42)   Sedimentation Rate, Erythrocyte: 68 mm/Hr   A1C with Estimated Average Glucose (01.28.21 @ 13:26)   A1C with Estimated Average Glucose Result: 9.6    CARDIAC MARKERS ( 28 Jan 2021 08:42 )  <0.015 ng/mL / x     / 45 U/L / x     / x            .Blood Blood-Peripheral  01-27 @ 22:01   No growth to date.  --  --    COVID-19 Antibody - for prior infection screening (01.28.21 @ 15:26)   COVID-19 IgG Antibody Interpretation: Positive:      Radiology Results      Meds    MEDICATIONS  (STANDING):  ascorbic acid 500 milliGRAM(s) Oral daily  aspirin  chewable 81 milliGRAM(s) Oral daily  atorvastatin 40 milliGRAM(s) Oral at bedtime  azithromycin  IVPB 500 milliGRAM(s) IV Intermittent every 24 hours  clopidogrel Tablet 75 milliGRAM(s) Oral daily  dexAMETHasone  Injectable 6 milliGRAM(s) IV Push daily  dextrose 40% Gel 15 Gram(s) Oral once  dextrose 5%. 1000 milliLiter(s) (50 mL/Hr) IV Continuous <Continuous>  dextrose 5%. 1000 milliLiter(s) (100 mL/Hr) IV Continuous <Continuous>  enoxaparin Injectable 40 milliGRAM(s) SubCutaneous daily  glucagon  Injectable 1 milliGRAM(s) IntraMuscular once  insulin glargine Injectable (LANTUS) 35 Unit(s) SubCutaneous at bedtime  insulin lispro (ADMELOG) corrective regimen sliding scale   SubCutaneous Before meals and at bedtime  insulin lispro Injectable (ADMELOG) 2 Unit(s) SubCutaneous three times a day before meals  valsartan 160 milliGRAM(s) Oral daily  zinc sulfate 220 milliGRAM(s) Oral daily      MEDICATIONS  (PRN):  ALBUTerol    90 MICROgram(s) HFA Inhaler 2 Puff(s) Inhalation every 6 hours PRN Bronchospasm  guaiFENesin   Syrup  (Sugar-Free) 200 milliGRAM(s) Oral every 6 hours PRN Cough      Physical Exam    Neuro :  no focal deficits  Respiratory: CTA B/L  CV: RRR, S1S2, no murmurs,   Abdominal: Soft, NT, ND +BS,  Extremities: No edema, + peripheral pulses    ASSESSMENT    right lower lobe pneumonia possibly bacterial,   covid 19 infection,   h/o NIDDM,   HTN,   CAD (coronary artery disease)  Fibromyalgia  cholecystectomy  appendectomy  tonsillectomy        PLAN    cont zithromax,   id f/u  f/u ucx   blood cx neg noted above  no remdesivir given covid ab neg noted above  cont decadron,   cont vit c, vitamin d, zinc, pepcid, singulair,   contact and airborne isolation,   pulm f/u  cont albuterol inhaler,   afebrile   cont tylenol prn,   cont robitussin prn   O2 sat (91% - 98%) on ra  O2 via nasal canula as needed  procalcitonin, D-dimer, esr, crp, ldh, ferritin, lactate noted above  f/u rept markers in 2 days,   hgba1c 9.6   endo cons   cont lantus 35 qhs and lispro 2 actid  cont lispro ss  cont current meds

## 2021-01-29 NOTE — PROGRESS NOTE ADULT - ASSESSMENT
Patient is a 55y old  Female who is post menopausal, with PMHx of NIDDM and HTN, presents to the ER for evaluation of fever, chills, and myalgias and diagnosed with COVID 19 since 1/19/21. She started having dry cough, worsening chills, and a few episodes of diarrhea. Patient endorses loss of smell and loss of appetite but denies chest pain, or any other acute complaints. On admission, she found to have low grade fever, tachycardia and tachypnea with hypoxia, and now saturating well on supplemental oxygenation via NC. She has started on Dexamethasone and the ID consult requested to assist with further management.       # Acute Respiratory failure - most  likely due to COVID -19  # SARS COV-2- Not a candidate for Remdesivir since titers is positive    would recommend:    1. Continue Dexamethasone to complete the course OR until discharge   2. Supplemental oxygenation and bronchodilator as needed  3. Continue supportive care  4. COVID precautions     d/w patient and Nursing staff       Attending Attestation:    Spent more than 45 minutes on total encounter, more than 50 % of the visit was spent counseling and/or coordinating care by the Attending physician. Patient is a 55y old  Female who is post menopausal, with PMHx of NIDDM and HTN, presents to the ER for evaluation of fever, chills, and myalgias and diagnosed with COVID 19 since 1/19/21. She started having dry cough, worsening chills, and a few episodes of diarrhea. Patient endorses loss of smell and loss of appetite but denies chest pain, or any other acute complaints. On admission, she found to have low grade fever, tachycardia and tachypnea with hypoxia, and now saturating well on supplemental oxygenation via NC. She has started on Dexamethasone and the ID consult requested to assist with further management.     # Acute Respiratory failure - most  likely due to COVID -19  # SARS COV-2- Not a candidate for Remdesivir since titers is positive    would recommend:    1. Please wean off oxygen as tolerated   2. Continue Dexamethasone to complete the course OR until discharge   3. Supplemental oxygenation and bronchodilator as needed  4. Continue supportive care including AC  5. COVID precautions     d/w  Nursing staff       Attending Attestation:    Spent more than 35 minutes on total encounter, more than 50 % of the visit was spent counseling and/or coordinating care by the Attending physician.

## 2021-01-29 NOTE — PROGRESS NOTE ADULT - SUBJECTIVE AND OBJECTIVE BOX
Patient is seen and examined at the bed side, is afebrile.      REVIEW OF SYSTEMS: All other review systems are negative        ALLERGIES: Cipro (Short breath)  penicillin (Rash)      Vital Signs Last 24 Hrs  T(C): 36.6 (29 Jan 2021 16:16), Max: 36.6 (29 Jan 2021 00:27)  T(F): 97.8 (29 Jan 2021 16:16), Max: 97.8 (29 Jan 2021 00:27)  HR: 78 (29 Jan 2021 16:16) (70 - 78)  BP: 142/66 (29 Jan 2021 16:16) (136/68 - 150/79)  BP(mean): 81 (29 Jan 2021 04:59) (81 - 81)  RR: 18 (29 Jan 2021 16:16) (17 - 18)  SpO2: 92% (29 Jan 2021 16:16) (90% - 94%)      PHYSICAL EXAM:  GENERAL: Not in distress, on oxygen via NC  CHEST/LUNG: Not using accessory muscles   HEART: s1 and s2 present  ABDOMEN:  Nontender and  Nondistended  EXTREMITIES: No pedal  edema  CNS: Awake and Alert      LABS:                        12.2   4.92  )-----------( 221      ( 29 Jan 2021 07:46 )             37.6                           12.6   3.02  )-----------( 181      ( 28 Jan 2021 08:42 )             38.8       01-29    136  |  103  |  18  ----------------------------<  304<H>  4.4   |  28  |  0.61    Ca    10.0      29 Jan 2021 07:46  Phos  2.5     01-29  Mg     2.2     01-29    TPro  6.8  /  Alb  2.6<L>  /  TBili  0.3  /  DBili  x   /  AST  18  /  ALT  58  /  AlkPhos  82  01-29 01-28    135  |  101  |  19<H>  ----------------------------<  299<H>  4.3   |  26  |  0.58    Ca    9.8      28 Jan 2021 08:42  Phos  2.7     01-28  Mg     2.2     01-28    TPro  7.1  /  Alb  2.7<L>  /  TBili  0.4  /  DBili  x   /  AST  31  /  ALT  71<H>  /  AlkPhos  94  01-27        CAPILLARY BLOOD GLUCOSE  POCT Blood Glucose.: 288 mg/dL (28 Jan 2021 21:16)  POCT Blood Glucose.: 261 mg/dL (28 Jan 2021 16:40)  POCT Blood Glucose.: 322 mg/dL (28 Jan 2021 )        MEDICATIONS  (STANDING):    ascorbic acid 1000 milliGRAM(s) Oral daily  aspirin  chewable 81 milliGRAM(s) Oral daily  atorvastatin 40 milliGRAM(s) Oral at bedtime  azithromycin  IVPB 500 milliGRAM(s) IV Intermittent every 24 hours  cholecalciferol 2000 Unit(s) Oral every 24 hours  clopidogrel Tablet 75 milliGRAM(s) Oral daily  dexAMETHasone  Injectable 6 milliGRAM(s) IV Push daily  dextrose 40% Gel 15 Gram(s) Oral once  dextrose 5%. 1000 milliLiter(s) (50 mL/Hr) IV Continuous <Continuous>  dextrose 5%. 1000 milliLiter(s) (100 mL/Hr) IV Continuous <Continuous>  enoxaparin Injectable 40 milliGRAM(s) SubCutaneous daily  famotidine    Tablet 40 milliGRAM(s) Oral two times a day  glucagon  Injectable 1 milliGRAM(s) IntraMuscular once  insulin glargine Injectable (LANTUS) 45 Unit(s) SubCutaneous at bedtime  insulin lispro (ADMELOG) corrective regimen sliding scale   SubCutaneous Before meals and at bedtime  insulin lispro Injectable (ADMELOG) 10 Unit(s) SubCutaneous three times a day before meals  montelukast 10 milliGRAM(s) Oral every 24 hours  senna 2 Tablet(s) Oral at bedtime  valsartan 160 milliGRAM(s) Oral daily  zinc sulfate 220 milliGRAM(s) Oral daily      RADIOLOGY & ADDITIONAL TESTS:    1/27/21< from: Xray Chest 1 View-PORTABLE IMMEDIATE (01.27.21 @ 17:00) The lungs show RIGHT lung base linear atelectasis/infiltrate. Remaining visualized lung parenchyma clear No pneumothorax.    Heart size within normal limits allowing for magnification effect of AP projection. Visualized osseous structures are intact.        MICROBIOLOGY DATA:    COVID-19 Antibody - for prior infection screening (01.28.21 @ 15:26)   COVID-19 IgG Antibody Index: 27.10:     Culture - Blood (01.27.21 @ 22:01)   Specimen Source: .Blood Blood-Peripheral   Culture Results: No growth to date.     Culture - Blood (01.27.21 @ 22:01)   Specimen Source: .Blood Blood-Peripheral   Culture Results: No growth to date.     D-Dimer Assay, Quantitative (01.27.21 @ 16:59) : 433 ng/mL DDU   COVID-19 Antibody - for prior infection screening (01.28.21 @ 15:26)   COVID-19 IgG Antibody Index: 27.10:   COVID-19 PCR . (01.27.21 @ 16:57)   COVID-19 PCR: Detected                             Patient is seen and examined at the bed side, is afebrile.  She remains on supplemental oxygenation.       REVIEW OF SYSTEMS: All other review systems are negative        ALLERGIES: Cipro (Short breath)  penicillin (Rash)      Vital Signs Last 24 Hrs  T(C): 36.6 (29 Jan 2021 16:16), Max: 36.6 (29 Jan 2021 00:27)  T(F): 97.8 (29 Jan 2021 16:16), Max: 97.8 (29 Jan 2021 00:27)  HR: 78 (29 Jan 2021 16:16) (70 - 78)  BP: 142/66 (29 Jan 2021 16:16) (136/68 - 150/79)  BP(mean): 81 (29 Jan 2021 04:59) (81 - 81)  RR: 18 (29 Jan 2021 16:16) (17 - 18)  SpO2: 92% (29 Jan 2021 16:16) (90% - 94%)      PHYSICAL EXAM:  GENERAL: Not in distress, on oxygen via NC  CHEST/LUNG: Not using accessory muscles   HEART: s1 and s2 present  ABDOMEN:  Nontender and  Nondistended  EXTREMITIES: No pedal  edema  CNS: Awake and Alert      LABS:                        12.2   4.92  )-----------( 221      ( 29 Jan 2021 07:46 )             37.6                           12.6   3.02  )-----------( 181      ( 28 Jan 2021 08:42 )             38.8       01-29    136  |  103  |  18  ----------------------------<  304<H>  4.4   |  28  |  0.61    Ca    10.0      29 Jan 2021 07:46  Phos  2.5     01-29  Mg     2.2     01-29    TPro  6.8  /  Alb  2.6<L>  /  TBili  0.3  /  DBili  x   /  AST  18  /  ALT  58  /  AlkPhos  82  01-29 01-28    135  |  101  |  19<H>  ----------------------------<  299<H>  4.3   |  26  |  0.58    Ca    9.8      28 Jan 2021 08:42  Phos  2.7     01-28  Mg     2.2     01-28    TPro  7.1  /  Alb  2.7<L>  /  TBili  0.4  /  DBili  x   /  AST  31  /  ALT  71<H>  /  AlkPhos  94  01-27        CAPILLARY BLOOD GLUCOSE  POCT Blood Glucose.: 288 mg/dL (28 Jan 2021 21:16)  POCT Blood Glucose.: 261 mg/dL (28 Jan 2021 16:40)  POCT Blood Glucose.: 322 mg/dL (28 Jan 2021 )        MEDICATIONS  (STANDING):    ascorbic acid 1000 milliGRAM(s) Oral daily  aspirin  chewable 81 milliGRAM(s) Oral daily  atorvastatin 40 milliGRAM(s) Oral at bedtime  azithromycin  IVPB 500 milliGRAM(s) IV Intermittent every 24 hours  cholecalciferol 2000 Unit(s) Oral every 24 hours  clopidogrel Tablet 75 milliGRAM(s) Oral daily  dexAMETHasone  Injectable 6 milliGRAM(s) IV Push daily  dextrose 40% Gel 15 Gram(s) Oral once  dextrose 5%. 1000 milliLiter(s) (50 mL/Hr) IV Continuous <Continuous>  dextrose 5%. 1000 milliLiter(s) (100 mL/Hr) IV Continuous <Continuous>  enoxaparin Injectable 40 milliGRAM(s) SubCutaneous daily  famotidine    Tablet 40 milliGRAM(s) Oral two times a day  glucagon  Injectable 1 milliGRAM(s) IntraMuscular once  insulin glargine Injectable (LANTUS) 45 Unit(s) SubCutaneous at bedtime  insulin lispro (ADMELOG) corrective regimen sliding scale   SubCutaneous Before meals and at bedtime  insulin lispro Injectable (ADMELOG) 10 Unit(s) SubCutaneous three times a day before meals  montelukast 10 milliGRAM(s) Oral every 24 hours  senna 2 Tablet(s) Oral at bedtime  valsartan 160 milliGRAM(s) Oral daily  zinc sulfate 220 milliGRAM(s) Oral daily      RADIOLOGY & ADDITIONAL TESTS:    1/27/21< from: Xray Chest 1 View-PORTABLE IMMEDIATE (01.27.21 @ 17:00) The lungs show RIGHT lung base linear atelectasis/infiltrate. Remaining visualized lung parenchyma clear No pneumothorax.    Heart size within normal limits allowing for magnification effect of AP projection. Visualized osseous structures are intact.        MICROBIOLOGY DATA:    COVID-19 Antibody - for prior infection screening (01.28.21 @ 15:26)   COVID-19 IgG Antibody Index: 27.10:     Culture - Blood (01.27.21 @ 22:01)   Specimen Source: .Blood Blood-Peripheral   Culture Results: No growth to date.     Culture - Blood (01.27.21 @ 22:01)   Specimen Source: .Blood Blood-Peripheral   Culture Results: No growth to date.     D-Dimer Assay, Quantitative (01.27.21 @ 16:59) : 433 ng/mL DDU   COVID-19 Antibody - for prior infection screening (01.28.21 @ 15:26)   COVID-19 IgG Antibody Index: 27.10:   COVID-19 PCR . (01.27.21 @ 16:57)   COVID-19 PCR: Detected

## 2021-01-29 NOTE — PROGRESS NOTE ADULT - SUBJECTIVE AND OBJECTIVE BOX
Pt is awake, alert, lying in bed in NAD. On O2 NC. Saturating on RA this AM at 91%.     INTERVAL HPI/OVERNIGHT EVENTS:      VITAL SIGNS:  T(F): 97.6 (01-29-21 @ 08:05)  HR: 78 (01-29-21 @ 08:05)  BP: 150/79 (01-29-21 @ 08:05)  RR: 18 (01-29-21 @ 08:05)  SpO2: 90% (01-29-21 @ 08:05)  Wt(kg): --  I&O's Detail          REVIEW OF SYSTEMS:    CONSTITUTIONAL:  No fevers, chills, sweats    HEENT:  Eyes:  No diplopia or blurred vision. ENT:  No earache, sore throat or runny nose.    CARDIOVASCULAR:  No pressure, squeezing, tightness, or heaviness about the chest; no palpitations.    RESPIRATORY:  Per HPI    GASTROINTESTINAL:  No abdominal pain, nausea, vomiting or diarrhea.    GENITOURINARY:  No dysuria, frequency or urgency.    NEUROLOGIC:  No paresthesias, fasciculations, seizures or weakness.    PSYCHIATRIC:  No disorder of thought or mood.      PHYSICAL EXAM:    Constitutional: Well developed and nourished  Eyes:Perrla  ENMT: normal  Neck:supple  Respiratory: good air entry  Cardiovascular: S1 S2 regular  Gastrointestinal: Soft, Non tender  Extremities: No edema  Vascular:normal  Neurological:Awake, alert,Ox3  Musculoskeletal:Normal      MEDICATIONS  (STANDING):  ascorbic acid 1000 milliGRAM(s) Oral daily  aspirin  chewable 81 milliGRAM(s) Oral daily  atorvastatin 40 milliGRAM(s) Oral at bedtime  azithromycin  IVPB 500 milliGRAM(s) IV Intermittent every 24 hours  cholecalciferol 2000 Unit(s) Oral every 24 hours  clopidogrel Tablet 75 milliGRAM(s) Oral daily  dexAMETHasone  Injectable 6 milliGRAM(s) IV Push daily  dextrose 40% Gel 15 Gram(s) Oral once  dextrose 5%. 1000 milliLiter(s) (50 mL/Hr) IV Continuous <Continuous>  dextrose 5%. 1000 milliLiter(s) (100 mL/Hr) IV Continuous <Continuous>  enoxaparin Injectable 40 milliGRAM(s) SubCutaneous daily  famotidine    Tablet 40 milliGRAM(s) Oral two times a day  glucagon  Injectable 1 milliGRAM(s) IntraMuscular once  insulin glargine Injectable (LANTUS) 45 Unit(s) SubCutaneous at bedtime  insulin lispro (ADMELOG) corrective regimen sliding scale   SubCutaneous Before meals and at bedtime  insulin lispro Injectable (ADMELOG) 5 Unit(s) SubCutaneous three times a day before meals  montelukast 10 milliGRAM(s) Oral every 24 hours  valsartan 160 milliGRAM(s) Oral daily  zinc sulfate 220 milliGRAM(s) Oral daily    MEDICATIONS  (PRN):  ALBUTerol    90 MICROgram(s) HFA Inhaler 2 Puff(s) Inhalation every 6 hours PRN Bronchospasm  guaiFENesin   Syrup  (Sugar-Free) 200 milliGRAM(s) Oral every 6 hours PRN Cough      Allergies    Cipro (Short breath)  penicillin (Rash)    Intolerances        LABS:                        12.2   4.92  )-----------( 221      ( 29 Jan 2021 07:46 )             37.6     01-29    136  |  103  |  18  ----------------------------<  304<H>  4.4   |  28  |  0.61    Ca    10.0      29 Jan 2021 07:46  Phos  2.5     01-29  Mg     2.2     01-29    TPro  6.8  /  Alb  2.6<L>  /  TBili  0.3  /  DBili  x   /  AST  18  /  ALT  58  /  AlkPhos  82  01-29          CARDIAC MARKERS ( 28 Jan 2021 08:42 )  <0.015 ng/mL / x     / 45 U/L / x     / x          CAPILLARY BLOOD GLUCOSE      POCT Blood Glucose.: 278 mg/dL (29 Jan 2021 07:44)  POCT Blood Glucose.: 288 mg/dL (28 Jan 2021 21:16)  POCT Blood Glucose.: 261 mg/dL (28 Jan 2021 16:40)  POCT Blood Glucose.: 322 mg/dL (28 Jan 2021 12:02)    pro-bnp -- 01-27 @ 16:59     d-dimer 433  01-27 @ 16:59      RADIOLOGY & ADDITIONAL TESTS:    CXR:    < from: Xray Chest 1 View-PORTABLE IMMEDIATE (01.27.21 @ 17:00) >  IMPRESSION:   RIGHT lung base linear atelectasis/airspace disease..      < end of copied text >  Ct scan chest:    ekg;    echo:

## 2021-01-29 NOTE — PROGRESS NOTE ADULT - SUBJECTIVE AND OBJECTIVE BOX
PGY-1 Progress Note discussed with attending    PAGER #: [[phone]] TILL 5:00 PM  PLEASE CONTACT ON CALL TEAM:  - On Call Team (Please refer to Trinh) FROM 5:00 PM - 8:30PM  - Nightfloat Team FROM 8:30 -7:30 AM    CHIEF COMPLAINT & BRIEF HOSPITAL COURSE:    INTERVAL HPI/OVERNIGHT EVENTS:       REVIEW OF SYSTEMS:  CONSTITUTIONAL: No fever, weight loss, or fatigue  RESPIRATORY: No cough, wheezing, chills or hemoptysis; No shortness of breath  CARDIOVASCULAR: No chest pain, palpitations, dizziness, or leg swelling  GASTROINTESTINAL: No abdominal pain. No nausea, vomiting, or hematemesis; No diarrhea or constipation. No melena or hematochezia.  GENITOURINARY: No dysuria or hematuria, urinary frequency  NEUROLOGICAL: No headaches, memory loss, loss of strength, numbness, or tremors  SKIN: No itching, burning, rashes, or lesions     MEDICATIONS  (STANDING):  ascorbic acid 1000 milliGRAM(s) Oral daily  aspirin  chewable 81 milliGRAM(s) Oral daily  atorvastatin 40 milliGRAM(s) Oral at bedtime  azithromycin  IVPB 500 milliGRAM(s) IV Intermittent every 24 hours  cholecalciferol 2000 Unit(s) Oral every 24 hours  clopidogrel Tablet 75 milliGRAM(s) Oral daily  dexAMETHasone  Injectable 6 milliGRAM(s) IV Push daily  dextrose 40% Gel 15 Gram(s) Oral once  dextrose 5%. 1000 milliLiter(s) (50 mL/Hr) IV Continuous <Continuous>  dextrose 5%. 1000 milliLiter(s) (100 mL/Hr) IV Continuous <Continuous>  enoxaparin Injectable 40 milliGRAM(s) SubCutaneous daily  famotidine    Tablet 40 milliGRAM(s) Oral two times a day  glucagon  Injectable 1 milliGRAM(s) IntraMuscular once  insulin glargine Injectable (LANTUS) 45 Unit(s) SubCutaneous at bedtime  insulin lispro (ADMELOG) corrective regimen sliding scale   SubCutaneous Before meals and at bedtime  insulin lispro Injectable (ADMELOG) 5 Unit(s) SubCutaneous three times a day before meals  montelukast 10 milliGRAM(s) Oral every 24 hours  valsartan 160 milliGRAM(s) Oral daily  zinc sulfate 220 milliGRAM(s) Oral daily    MEDICATIONS  (PRN):  ALBUTerol    90 MICROgram(s) HFA Inhaler 2 Puff(s) Inhalation every 6 hours PRN Bronchospasm  guaiFENesin   Syrup  (Sugar-Free) 200 milliGRAM(s) Oral every 6 hours PRN Cough      Vital Signs Last 24 Hrs  T(C): 36.4 (29 Jan 2021 08:05), Max: 37.1 (28 Jan 2021 16:23)  T(F): 97.6 (29 Jan 2021 08:05), Max: 98.8 (28 Jan 2021 16:23)  HR: 78 (29 Jan 2021 08:05) (70 - 85)  BP: 150/79 (29 Jan 2021 08:05) (134/79 - 150/79)  BP(mean): 81 (29 Jan 2021 04:59) (81 - 81)  RR: 18 (29 Jan 2021 08:05) (17 - 18)  SpO2: 90% (29 Jan 2021 08:05) (90% - 98%)    PHYSICAL EXAMINATION:  GENERAL: NAD, well built  HEAD:  Atraumatic, Normocephalic  EYES:  conjunctiva and sclera clear  NECK: Supple, No JVD, Normal thyroid  CHEST/LUNG: Clear to auscultation. Clear to percussion bilaterally; No rales, rhonchi, wheezing, or rubs  HEART: Regular rate and rhythm; No murmurs, rubs, or gallops  ABDOMEN: Soft, Nontender, Nondistended; Bowel sounds present  NERVOUS SYSTEM:  Alert & Oriented X3,    EXTREMITIES:  2+ Peripheral Pulses, No clubbing, cyanosis, or edema  SKIN: warm dry                          12.2   4.92  )-----------( 221      ( 29 Jan 2021 07:46 )             37.6     01-29    136  |  103  |  18  ----------------------------<  304<H>  4.4   |  28  |  0.61    Ca    10.0      29 Jan 2021 07:46  Phos  2.5     01-29  Mg     2.2     01-29    TPro  6.8  /  Alb  2.6<L>  /  TBili  0.3  /  DBili  x   /  AST  18  /  ALT  58  /  AlkPhos  82  01-29    LIVER FUNCTIONS - ( 29 Jan 2021 07:46 )  Alb: 2.6 g/dL / Pro: 6.8 g/dL / ALK PHOS: 82 U/L / ALT: 58 U/L DA / AST: 18 U/L / GGT: x           CARDIAC MARKERS ( 28 Jan 2021 08:42 )  <0.015 ng/mL / x     / 45 U/L / x     / x                  CAPILLARY BLOOD GLUCOSE  CAPILLARY BLOOD GLUCOSE      POCT Blood Glucose.: 278 mg/dL (29 Jan 2021 07:44)    CAPILLARY BLOOD GLUCOSE      POCT Blood Glucose.: 278 mg/dL (29 Jan 2021 07:44)  POCT Blood Glucose.: 288 mg/dL (28 Jan 2021 21:16)  POCT Blood Glucose.: 261 mg/dL (28 Jan 2021 16:40)  POCT Blood Glucose.: 322 mg/dL (28 Jan 2021 12:02)      RADIOLOGY & ADDITIONAL TESTS:                   PGY-1 Progress Note discussed with attending    PAGER #: [[phone]] TILL 5:00 PM  PLEASE CONTACT ON CALL TEAM:  - On Call Team (Please refer to Trinh) FROM 5:00 PM - 8:30PM  - Nightfloat Team FROM 8:30 -7:30 AM      INTERVAL HPI/OVERNIGHT EVENTS:   patient examined bed side, she is comfortable , saturating 90% on 2 L NC ,she c/o constipation, her HBA1c is 9.6, endocrinology was consulted.     REVIEW OF SYSTEMS:  CONSTITUTIONAL: No fever, weight loss, or fatigue  RESPIRATORY: mild SOB   CARDIOVASCULAR: No chest pain, palpitations, dizziness, or leg swelling  GASTROINTESTINAL: c/o constipation  GENITOURINARY: No dysuria or hematuria, urinary frequency  NEUROLOGICAL: No headaches, memory loss, loss of strength, numbness, or tremors  SKIN: No itching, burning, rashes, or lesions     MEDICATIONS  (STANDING):  ascorbic acid 1000 milliGRAM(s) Oral daily  aspirin  chewable 81 milliGRAM(s) Oral daily  atorvastatin 40 milliGRAM(s) Oral at bedtime  azithromycin  IVPB 500 milliGRAM(s) IV Intermittent every 24 hours  cholecalciferol 2000 Unit(s) Oral every 24 hours  clopidogrel Tablet 75 milliGRAM(s) Oral daily  dexAMETHasone  Injectable 6 milliGRAM(s) IV Push daily  dextrose 40% Gel 15 Gram(s) Oral once  dextrose 5%. 1000 milliLiter(s) (50 mL/Hr) IV Continuous <Continuous>  dextrose 5%. 1000 milliLiter(s) (100 mL/Hr) IV Continuous <Continuous>  enoxaparin Injectable 40 milliGRAM(s) SubCutaneous daily  famotidine    Tablet 40 milliGRAM(s) Oral two times a day  glucagon  Injectable 1 milliGRAM(s) IntraMuscular once  insulin glargine Injectable (LANTUS) 45 Unit(s) SubCutaneous at bedtime  insulin lispro (ADMELOG) corrective regimen sliding scale   SubCutaneous Before meals and at bedtime  insulin lispro Injectable (ADMELOG) 5 Unit(s) SubCutaneous three times a day before meals  montelukast 10 milliGRAM(s) Oral every 24 hours  valsartan 160 milliGRAM(s) Oral daily  zinc sulfate 220 milliGRAM(s) Oral daily    MEDICATIONS  (PRN):  ALBUTerol    90 MICROgram(s) HFA Inhaler 2 Puff(s) Inhalation every 6 hours PRN Bronchospasm  guaiFENesin   Syrup  (Sugar-Free) 200 milliGRAM(s) Oral every 6 hours PRN Cough      Vital Signs Last 24 Hrs  T(C): 36.4 (29 Jan 2021 08:05), Max: 37.1 (28 Jan 2021 16:23)  T(F): 97.6 (29 Jan 2021 08:05), Max: 98.8 (28 Jan 2021 16:23)  HR: 78 (29 Jan 2021 08:05) (70 - 85)  BP: 150/79 (29 Jan 2021 08:05) (134/79 - 150/79)  BP(mean): 81 (29 Jan 2021 04:59) (81 - 81)  RR: 18 (29 Jan 2021 08:05) (17 - 18)  SpO2: 90% (29 Jan 2021 08:05) (90% - 98%)    PHYSICAL EXAMINATION:  GENERAL: NAD, on NC  HEAD:  Atraumatic, Normocephalic  EYES:  conjunctiva and sclera clear  NECK: Supple, No JVD, Normal thyroid  CHEST/LUNG: Clear to auscultation. Clear to percussion bilaterally; No rales, rhonchi, wheezing, or rubs  HEART: Regular rate and rhythm; No murmurs, rubs, or gallops  ABDOMEN: Soft, Nontender, Nondistended; Bowel sounds present  NERVOUS SYSTEM:  Alert & Oriented X3,    EXTREMITIES:  2+ Peripheral Pulses, No clubbing, cyanosis, or edema  SKIN: warm dry                          12.2   4.92  )-----------( 221      ( 29 Jan 2021 07:46 )             37.6     01-29    136  |  103  |  18  ----------------------------<  304<H>  4.4   |  28  |  0.61    Ca    10.0      29 Jan 2021 07:46  Phos  2.5     01-29  Mg     2.2     01-29    TPro  6.8  /  Alb  2.6<L>  /  TBili  0.3  /  DBili  x   /  AST  18  /  ALT  58  /  AlkPhos  82  01-29    LIVER FUNCTIONS - ( 29 Jan 2021 07:46 )  Alb: 2.6 g/dL / Pro: 6.8 g/dL / ALK PHOS: 82 U/L / ALT: 58 U/L DA / AST: 18 U/L / GGT: x           CARDIAC MARKERS ( 28 Jan 2021 08:42 )  <0.015 ng/mL / x     / 45 U/L / x     / x                  CAPILLARY BLOOD GLUCOSE  CAPILLARY BLOOD GLUCOSE      POCT Blood Glucose.: 278 mg/dL (29 Jan 2021 07:44)    CAPILLARY BLOOD GLUCOSE      POCT Blood Glucose.: 278 mg/dL (29 Jan 2021 07:44)  POCT Blood Glucose.: 288 mg/dL (28 Jan 2021 21:16)  POCT Blood Glucose.: 261 mg/dL (28 Jan 2021 16:40)  POCT Blood Glucose.: 322 mg/dL (28 Jan 2021 12:02)      RADIOLOGY & ADDITIONAL TESTS:

## 2021-01-29 NOTE — CONSULT NOTE ADULT - PROBLEM SELECTOR RECOMMENDATION 9
-pt on levemir 30u at home, recently switched form victoza to ozempic due to insurance issues but has not been able to start  -a1c on admission noted 9.6  -fs elevated despite lantus 30u, uptitrated last night to 35u   -started on steroids on 1.28 with AM fs 304  -agree with inc in lantus to 45u qhs  -inc premeal admelog to 10 u  -cont HSS  -monitor fs ac hs -pt on levemir 30u at home, recently switched form victoza to ozempic due to insurance issues but has not been able to start  -a1c on admission noted 9.6  -fs elevated despite lantus 30u, uptitrated last night to 35u   -started on steroids on 1/28 with AM fs 304  -agree with inc in lantus to 45u qhs  -inc premeal admelog to 10 u  -cont HSS  -monitor fs ac hs -pt on levemir 30u at home, recently switched form victoza to ozempic due to insurance issues but has not been able to start  -a1c on admission noted 9.6  -fs elevated despite lantus 30u, uptitrated last night to 35u   -started on steroids on 1/28 with AM fs 304  -agree with inc in lantus to 45u qhs  -inc premeal admelog to 10 u  -cont HSS  -monitor fs ac hs  adjust meds once off of steroids

## 2021-01-29 NOTE — CONSULT NOTE ADULT - PROBLEM SELECTOR RECOMMENDATION 2
-concern for bacterial superinfection  -on decadron D2  -abx as per primary team
monitor BP  cont meds

## 2021-01-29 NOTE — CONSULT NOTE ADULT - SUBJECTIVE AND OBJECTIVE BOX
Patient is a 55y old  Female who presents with a chief complaint of fever chills (2021 11:05)      HPI:  55 year old female, post menopausal, with PMHx of NIDDM and HTN presenting with fever, chills, and myalgias and diagnosis of COVID 19 since . Pt states she started having dry cough 3 days ago and worsening of chills. Pt also had a few episodes of diarrhea. Patient endorses loss of smell and loss of appetite but denies chest pain, or any other acute complaints.    Endocrine Hx: pt admitted to medical service for covid pneumonia. Reports being on levemir 30u at home, was initially on victoza however due to lack of insurance coverage was switched to Ozempic but has not  been able to start since  couple weeks since she was not feeling well. Fs was elevated even on admission, now started on decadron with noted elevated fs in range of 280-300.       PAST MEDICAL & SURGICAL HISTORY:  CAD (coronary artery disease)    Fibromyalgia    Hypertension    Diabetes    S/P     S/P cholecystectomy    S/P appendectomy    S/P tonsillectomy           MEDICATIONS  (STANDING):  ascorbic acid 1000 milliGRAM(s) Oral daily  aspirin  chewable 81 milliGRAM(s) Oral daily  atorvastatin 40 milliGRAM(s) Oral at bedtime  azithromycin  IVPB 500 milliGRAM(s) IV Intermittent every 24 hours  cholecalciferol 2000 Unit(s) Oral every 24 hours  clopidogrel Tablet 75 milliGRAM(s) Oral daily  dexAMETHasone  Injectable 6 milliGRAM(s) IV Push daily  dextrose 40% Gel 15 Gram(s) Oral once  dextrose 5%. 1000 milliLiter(s) (50 mL/Hr) IV Continuous <Continuous>  dextrose 5%. 1000 milliLiter(s) (100 mL/Hr) IV Continuous <Continuous>  enoxaparin Injectable 40 milliGRAM(s) SubCutaneous daily  famotidine    Tablet 40 milliGRAM(s) Oral two times a day  glucagon  Injectable 1 milliGRAM(s) IntraMuscular once  insulin glargine Injectable (LANTUS) 45 Unit(s) SubCutaneous at bedtime  insulin lispro (ADMELOG) corrective regimen sliding scale   SubCutaneous Before meals and at bedtime  insulin lispro Injectable (ADMELOG) 5 Unit(s) SubCutaneous three times a day before meals  montelukast 10 milliGRAM(s) Oral every 24 hours  senna 2 Tablet(s) Oral at bedtime  valsartan 160 milliGRAM(s) Oral daily  zinc sulfate 220 milliGRAM(s) Oral daily    MEDICATIONS  (PRN):  ALBUTerol    90 MICROgram(s) HFA Inhaler 2 Puff(s) Inhalation every 6 hours PRN Bronchospasm  guaiFENesin   Syrup  (Sugar-Free) 200 milliGRAM(s) Oral every 6 hours PRN Cough      FAMILY HISTORY:  Family history of cardiac arrest    Family history of stroke        SOCIAL HISTORY:      REVIEW OF SYSTEMS:  CONSTITUTIONAL: No fever, weight loss, or fatigue  EYES: No eye pain, visual disturbances, or discharge  ENT:  No difficulty hearing, tinnitus, vertigo; No sinus or throat pain  NECK: No pain or stiffness  RESPIRATORY: No cough, wheezing, chills or hemoptysis; + Shortness of Breath, improving  CARDIOVASCULAR: No chest pain, palpitations, passing out, dizziness, or leg swelling  GASTROINTESTINAL: No abdominal or epigastric pain. No nausea, vomiting, or hematemesis; No diarrhea or constipation. No melena or hematochezia.  GENITOURINARY: No dysuria, frequency, hematuria, or incontinence  NEUROLOGICAL: No headaches, memory loss, loss of strength, numbness, or tremors  SKIN: No itching, burning, rashes, or lesions   LYMPH Nodes: No enlarged glands  ENDOCRINE: No heat or cold intolerance; No hair loss  MUSCULOSKELETAL: No joint pain or swelling; No muscle, back, or extremity pain  PSYCHIATRIC: No depression, anxiety, mood swings, or difficulty sleeping  HEME/LYMPH: No easy bruising, or bleeding gums  ALLERGY AND IMMUNOLOGIC: No hives or eczema	        Vital Signs Last 24 Hrs  T(C): 36.4 (2021 08:05), Max: 37.1 (2021 16:23)  T(F): 97.6 (2021 08:05), Max: 98.8 (2021 16:23)  HR: 78 (2021 08:05) (70 - 85)  BP: 150/79 (2021 08:05) (134/79 - 150/79)  BP(mean): 81 (2021 04:59) (81 - 81)  RR: 18 (2021 11:52) (17 - 18)  SpO2: 94% (2021 11:52) (90% - 98%)      Constitutional:    NC/AT:    HEENT:    Neck:  No JVD, bruits or thyromegaly    Respiratory:  Clear without rales or rhonchi    Cardiovascular:  RR without murmur, rub or gallop.    Gastrointestinal: Soft without hepatosplenomegaly.    Extremities: without cyanosis, clubbing or edema.    Neurological:  Oriented   x 3  . No gross sensory or motor defects.        LABS:                        12.2   4.92  )-----------( 221      ( 2021 07:46 )             37.6         136  |  103  |  18  ----------------------------<  304<H>  4.4   |  28  |  0.61    Ca    10.0      2021 07:46  Phos  2.5       Mg     2.2         TPro  6.8  /  Alb  2.6<L>  /  TBili  0.3  /  DBili  x   /  AST  18  /  ALT  58  /  AlkPhos  82      CARDIAC MARKERS ( 2021 08:42 )  <0.015 ng/mL / x     / 45 U/L / x     / x              CAPILLARY BLOOD GLUCOSE      POCT Blood Glucose.: 341 mg/dL (2021 11:16)  POCT Blood Glucose.: 278 mg/dL (2021 07:44)  POCT Blood Glucose.: 288 mg/dL (2021 21:16)  POCT Blood Glucose.: 261 mg/dL (2021 16:40)

## 2021-01-30 LAB
ALBUMIN SERPL ELPH-MCNC: 2.6 G/DL — LOW (ref 3.5–5)
ALP SERPL-CCNC: 78 U/L — SIGNIFICANT CHANGE UP (ref 40–120)
ALT FLD-CCNC: 58 U/L DA — SIGNIFICANT CHANGE UP (ref 10–60)
ANION GAP SERPL CALC-SCNC: 5 MMOL/L — SIGNIFICANT CHANGE UP (ref 5–17)
AST SERPL-CCNC: 19 U/L — SIGNIFICANT CHANGE UP (ref 10–40)
BILIRUB SERPL-MCNC: 0.3 MG/DL — SIGNIFICANT CHANGE UP (ref 0.2–1.2)
BUN SERPL-MCNC: 22 MG/DL — HIGH (ref 7–18)
CALCIUM SERPL-MCNC: 9.5 MG/DL — SIGNIFICANT CHANGE UP (ref 8.4–10.5)
CHLORIDE SERPL-SCNC: 104 MMOL/L — SIGNIFICANT CHANGE UP (ref 96–108)
CO2 SERPL-SCNC: 31 MMOL/L — SIGNIFICANT CHANGE UP (ref 22–31)
CREAT SERPL-MCNC: 0.53 MG/DL — SIGNIFICANT CHANGE UP (ref 0.5–1.3)
GLUCOSE BLDC GLUCOMTR-MCNC: 149 MG/DL — HIGH (ref 70–99)
GLUCOSE BLDC GLUCOMTR-MCNC: 213 MG/DL — HIGH (ref 70–99)
GLUCOSE BLDC GLUCOMTR-MCNC: 220 MG/DL — HIGH (ref 70–99)
GLUCOSE BLDC GLUCOMTR-MCNC: 286 MG/DL — HIGH (ref 70–99)
GLUCOSE SERPL-MCNC: 142 MG/DL — HIGH (ref 70–99)
HCT VFR BLD CALC: 37.7 % — SIGNIFICANT CHANGE UP (ref 34.5–45)
HGB BLD-MCNC: 12.2 G/DL — SIGNIFICANT CHANGE UP (ref 11.5–15.5)
MAGNESIUM SERPL-MCNC: 2 MG/DL — SIGNIFICANT CHANGE UP (ref 1.6–2.6)
MCHC RBC-ENTMCNC: 30.1 PG — SIGNIFICANT CHANGE UP (ref 27–34)
MCHC RBC-ENTMCNC: 32.4 GM/DL — SIGNIFICANT CHANGE UP (ref 32–36)
MCV RBC AUTO: 93.1 FL — SIGNIFICANT CHANGE UP (ref 80–100)
NRBC # BLD: 0 /100 WBCS — SIGNIFICANT CHANGE UP (ref 0–0)
PHOSPHATE SERPL-MCNC: 2.7 MG/DL — SIGNIFICANT CHANGE UP (ref 2.5–4.5)
PLATELET # BLD AUTO: 284 K/UL — SIGNIFICANT CHANGE UP (ref 150–400)
POTASSIUM SERPL-MCNC: 3.7 MMOL/L — SIGNIFICANT CHANGE UP (ref 3.5–5.3)
POTASSIUM SERPL-SCNC: 3.7 MMOL/L — SIGNIFICANT CHANGE UP (ref 3.5–5.3)
PROT SERPL-MCNC: 6.8 G/DL — SIGNIFICANT CHANGE UP (ref 6–8.3)
RBC # BLD: 4.05 M/UL — SIGNIFICANT CHANGE UP (ref 3.8–5.2)
RBC # FLD: 12.3 % — SIGNIFICANT CHANGE UP (ref 10.3–14.5)
SODIUM SERPL-SCNC: 140 MMOL/L — SIGNIFICANT CHANGE UP (ref 135–145)
WBC # BLD: 7.48 K/UL — SIGNIFICANT CHANGE UP (ref 3.8–10.5)
WBC # FLD AUTO: 7.48 K/UL — SIGNIFICANT CHANGE UP (ref 3.8–10.5)

## 2021-01-30 RX ADMIN — Medication 10 UNIT(S): at 08:50

## 2021-01-30 RX ADMIN — Medication 2: at 17:28

## 2021-01-30 RX ADMIN — ZINC SULFATE TAB 220 MG (50 MG ZINC EQUIVALENT) 220 MILLIGRAM(S): 220 (50 ZN) TAB at 12:13

## 2021-01-30 RX ADMIN — Medication 3: at 12:14

## 2021-01-30 RX ADMIN — ATORVASTATIN CALCIUM 40 MILLIGRAM(S): 80 TABLET, FILM COATED ORAL at 21:24

## 2021-01-30 RX ADMIN — FAMOTIDINE 40 MILLIGRAM(S): 10 INJECTION INTRAVENOUS at 17:28

## 2021-01-30 RX ADMIN — Medication 2: at 21:25

## 2021-01-30 RX ADMIN — AZITHROMYCIN 255 MILLIGRAM(S): 500 TABLET, FILM COATED ORAL at 10:20

## 2021-01-30 RX ADMIN — VALSARTAN 160 MILLIGRAM(S): 80 TABLET ORAL at 05:51

## 2021-01-30 RX ADMIN — Medication 6 MILLIGRAM(S): at 05:50

## 2021-01-30 RX ADMIN — Medication 200 MILLIGRAM(S): at 05:53

## 2021-01-30 RX ADMIN — FAMOTIDINE 40 MILLIGRAM(S): 10 INJECTION INTRAVENOUS at 05:51

## 2021-01-30 RX ADMIN — Medication 10 UNIT(S): at 17:28

## 2021-01-30 RX ADMIN — Medication 10 UNIT(S): at 12:14

## 2021-01-30 RX ADMIN — ENOXAPARIN SODIUM 40 MILLIGRAM(S): 100 INJECTION SUBCUTANEOUS at 12:13

## 2021-01-30 RX ADMIN — SENNA PLUS 2 TABLET(S): 8.6 TABLET ORAL at 21:24

## 2021-01-30 RX ADMIN — Medication 2000 UNIT(S): at 08:50

## 2021-01-30 RX ADMIN — CLOPIDOGREL BISULFATE 75 MILLIGRAM(S): 75 TABLET, FILM COATED ORAL at 12:13

## 2021-01-30 RX ADMIN — Medication 81 MILLIGRAM(S): at 12:13

## 2021-01-30 RX ADMIN — Medication 1000 MILLIGRAM(S): at 12:13

## 2021-01-30 RX ADMIN — MONTELUKAST 10 MILLIGRAM(S): 4 TABLET, CHEWABLE ORAL at 12:13

## 2021-01-30 RX ADMIN — INSULIN GLARGINE 45 UNIT(S): 100 INJECTION, SOLUTION SUBCUTANEOUS at 21:24

## 2021-01-30 NOTE — PROGRESS NOTE ADULT - SUBJECTIVE AND OBJECTIVE BOX
Patient is seen and examined at the bed side, is afebrile.  She remains on supplemental oxygenation.       REVIEW OF SYSTEMS: All other review systems are negative        ALLERGIES: Cipro (Short breath)  penicillin (Rash)      Vital Signs Last 24 Hrs  T(C): 36.4 (30 Jan 2021 16:07), Max: 36.4 (30 Jan 2021 16:07)  T(F): 97.6 (30 Jan 2021 16:07), Max: 97.6 (30 Jan 2021 16:07)  HR: 72 (30 Jan 2021 16:07) (66 - 87)  BP: 154/60 (30 Jan 2021 16:07) (143/58 - 154/60)  BP(mean): --  RR: 18 (30 Jan 2021 16:07) (16 - 19)  SpO2: 97% (30 Jan 2021 18:29) (93% - 98%)      PHYSICAL EXAM:  GENERAL: Not in distress, on oxygen via NC  CHEST/LUNG: Not using accessory muscles   HEART: s1 and s2 present  ABDOMEN:  Nontender and  Nondistended  EXTREMITIES: No pedal  edema  CNS: Awake and Alert      LABS:                        12.2   7.48  )-----------( 284      ( 30 Jan 2021 09:57 )             37.7                           12.2   4.92  )-----------( 221      ( 29 Jan 2021 07:46 )             37.6       01-30    140  |  104  |  22<H>  ----------------------------<  142<H>  3.7   |  31  |  0.53    Ca    9.5      30 Jan 2021 09:57  Phos  2.7     01-30  Mg     2.0     01-30    TPro  6.8  /  Alb  2.6<L>  /  TBili  0.3  /  DBili  x   /  AST  19  /  ALT  58  /  AlkPhos  78  01-30 01-29    136  |  103  |  18  ----------------------------<  304<H>  4.4   |  28  |  0.61    Ca    10.0      29 Jan 2021 07:46  Phos  2.5     01-29  Mg     2.2     01-29    TPro  6.8  /  Alb  2.6<L>  /  TBili  0.3  /  DBili  x   /  AST  18  /  ALT  58  /  AlkPhos  82  01-29        CAPILLARY BLOOD GLUCOSE  POCT Blood Glucose.: 288 mg/dL (28 Jan 2021 21:16)  POCT Blood Glucose.: 261 mg/dL (28 Jan 2021 16:40)  POCT Blood Glucose.: 322 mg/dL (28 Jan 2021 )        MEDICATIONS  (STANDING):    ascorbic acid 1000 milliGRAM(s) Oral daily  aspirin  chewable 81 milliGRAM(s) Oral daily  atorvastatin 40 milliGRAM(s) Oral at bedtime  azithromycin  IVPB 500 milliGRAM(s) IV Intermittent every 24 hours  cholecalciferol 2000 Unit(s) Oral every 24 hours  clopidogrel Tablet 75 milliGRAM(s) Oral daily  dexAMETHasone  Injectable 6 milliGRAM(s) IV Push daily  dextrose 40% Gel 15 Gram(s) Oral once  dextrose 5%. 1000 milliLiter(s) (50 mL/Hr) IV Continuous <Continuous>  dextrose 5%. 1000 milliLiter(s) (100 mL/Hr) IV Continuous <Continuous>  enoxaparin Injectable 40 milliGRAM(s) SubCutaneous daily  famotidine    Tablet 40 milliGRAM(s) Oral two times a day  glucagon  Injectable 1 milliGRAM(s) IntraMuscular once  insulin glargine Injectable (LANTUS) 45 Unit(s) SubCutaneous at bedtime  insulin lispro (ADMELOG) corrective regimen sliding scale   SubCutaneous Before meals and at bedtime  insulin lispro Injectable (ADMELOG) 10 Unit(s) SubCutaneous three times a day before meals  montelukast 10 milliGRAM(s) Oral every 24 hours  senna 2 Tablet(s) Oral at bedtime  valsartan 160 milliGRAM(s) Oral daily  zinc sulfate 220 milliGRAM(s) Oral daily      RADIOLOGY & ADDITIONAL TESTS:    1/27/21< from: Xray Chest 1 View-PORTABLE IMMEDIATE (01.27.21 @ 17:00) The lungs show RIGHT lung base linear atelectasis/infiltrate. Remaining visualized lung parenchyma clear No pneumothorax.    Heart size within normal limits allowing for magnification effect of AP projection. Visualized osseous structures are intact.        MICROBIOLOGY DATA:    COVID-19 Antibody - for prior infection screening (01.28.21 @ 15:26)   COVID-19 IgG Antibody Index: 27.10:     Culture - Blood (01.27.21 @ 22:01)   Specimen Source: .Blood Blood-Peripheral   Culture Results: No growth to date.     Culture - Blood (01.27.21 @ 22:01)   Specimen Source: .Blood Blood-Peripheral   Culture Results: No growth to date.     D-Dimer Assay, Quantitative (01.27.21 @ 16:59) : 433 ng/mL DDU   COVID-19 Antibody - for prior infection screening (01.28.21 @ 15:26)   COVID-19 IgG Antibody Index: 27.10:   COVID-19 PCR . (01.27.21 @ 16:57)   COVID-19 PCR: Detected                                 Patient is seen and examined at the bed side, is afebrile.  She is OFF supplemental oxygenation today, saturating well at room air.       REVIEW OF SYSTEMS: All other review systems are negative      ALLERGIES: Cipro (Short breath)  and penicillin (Rash)      Vital Signs Last 24 Hrs  T(C): 36.4 (30 Jan 2021 16:07), Max: 36.4 (30 Jan 2021 16:07)  T(F): 97.6 (30 Jan 2021 16:07), Max: 97.6 (30 Jan 2021 16:07)  HR: 72 (30 Jan 2021 16:07) (66 - 87)  BP: 154/60 (30 Jan 2021 16:07) (143/58 - 154/60)  BP(mean): --  RR: 18 (30 Jan 2021 16:07) (16 - 19)  SpO2: 97% (30 Jan 2021 18:29) (93% - 98%)      PHYSICAL EXAM:  GENERAL: Not in distress, oFF oxygen   CHEST/LUNG: Not using accessory muscles   HEART: s1 and s2 present  ABDOMEN:  Nontender and  Nondistended  EXTREMITIES: No pedal  edema  CNS: Awake and Alert      LABS:                        12.2   7.48  )-----------( 284      ( 30 Jan 2021 09:57 )             37.7                           12.2   4.92  )-----------( 221      ( 29 Jan 2021 07:46 )             37.6       01-30    140  |  104  |  22<H>  ----------------------------<  142<H>  3.7   |  31  |  0.53    Ca    9.5      30 Jan 2021 09:57  Phos  2.7     01-30  Mg     2.0     01-30    TPro  6.8  /  Alb  2.6<L>  /  TBili  0.3  /  DBili  x   /  AST  19  /  ALT  58  /  AlkPhos  78  01-30 01-29    136  |  103  |  18  ----------------------------<  304<H>  4.4   |  28  |  0.61    Ca    10.0      29 Jan 2021 07:46  Phos  2.5     01-29  Mg     2.2     01-29    TPro  6.8  /  Alb  2.6<L>  /  TBili  0.3  /  DBili  x   /  AST  18  /  ALT  58  /  AlkPhos  82  01-29        CAPILLARY BLOOD GLUCOSE  POCT Blood Glucose.: 288 mg/dL (28 Jan 2021 21:16)  POCT Blood Glucose.: 261 mg/dL (28 Jan 2021 16:40)  POCT Blood Glucose.: 322 mg/dL (28 Jan 2021 )        MEDICATIONS  (STANDING):    ascorbic acid 1000 milliGRAM(s) Oral daily  aspirin  chewable 81 milliGRAM(s) Oral daily  atorvastatin 40 milliGRAM(s) Oral at bedtime  azithromycin  IVPB 500 milliGRAM(s) IV Intermittent every 24 hours  cholecalciferol 2000 Unit(s) Oral every 24 hours  clopidogrel Tablet 75 milliGRAM(s) Oral daily  dexAMETHasone  Injectable 6 milliGRAM(s) IV Push daily ontinuous>  enoxaparin Injectable 40 milliGRAM(s) SubCutaneous daily  famotidine    Tablet 40 milliGRAM(s) Oral two times a day  glucagon  Injectable 1 milliGRAM(s) IntraMuscular once  insulin glargine Injectable (LANTUS) 45 Unit(s) SubCutaneous at bedtime  insulin lispro (ADMELOG) corrective regimen sliding scale   SubCutaneous Before meals and at bedtime  insulin lispro Injectable (ADMELOG) 10 Unit(s) SubCutaneous three times a day before meals  montelukast 10 milliGRAM(s) Oral every 24 hours  senna 2 Tablet(s) Oral at bedtime  valsartan 160 milliGRAM(s) Oral daily  zinc sulfate 220 milliGRAM(s) Oral daily      RADIOLOGY & ADDITIONAL TESTS:    1/27/21< from: Xray Chest 1 View-PORTABLE IMMEDIATE (01.27.21 @ 17:00) The lungs show RIGHT lung base linear atelectasis/infiltrate. Remaining visualized lung parenchyma clear No pneumothorax.    Heart size within normal limits allowing for magnification effect of AP projection. Visualized osseous structures are intact.        MICROBIOLOGY DATA:    COVID-19 Antibody - for prior infection screening (01.28.21 @ 15:26)   COVID-19 IgG Antibody Index: 27.10:     Culture - Blood (01.27.21 @ 22:01)   Specimen Source: .Blood Blood-Peripheral   Culture Results: No growth to date.     Culture - Blood (01.27.21 @ 22:01)   Specimen Source: .Blood Blood-Peripheral   Culture Results: No growth to date.     D-Dimer Assay, Quantitative (01.27.21 @ 16:59) : 433 ng/mL DDU   COVID-19 Antibody - for prior infection screening (01.28.21 @ 15:26)   COVID-19 IgG Antibody Index: 27.10:   COVID-19 PCR . (01.27.21 @ 16:57)   COVID-19 PCR: Detected

## 2021-01-30 NOTE — PROGRESS NOTE ADULT - ASSESSMENT
Problem/Recommendation - 1:  Problem: COVID-19. Recommendation: isolation precautions  oxygen supp - taper as feasible.  monitor oxygen sat  Monitor  LDH, LFTs, CRP, D-Dimer, Ferritin and procalcitonin  vit C, D and zinc supp  continue dexamethasone.   Bronchodilators.  ID F/U   DVT and GI PPX     Problem/Recommendation - 2:  ·  Problem: Hypertension.  Recommendation: monitor BP  cont meds.     Problem/Recommendation - 3:  ·  Problem: Diabetes.  Recommendation: Accuchecks with reg insulin coverage  HBA1C.     Problem/Recommendation - 4:  ·  Problem: Fibromyalgia.  Recommendation: cont meds  Rheum follow up as OP.

## 2021-01-30 NOTE — PROGRESS NOTE ADULT - SUBJECTIVE AND OBJECTIVE BOX
Patient is a 55y old  Female who presents with a chief complaint of fever chills (30 Jan 2021 08:50)  Awake, alert, comfortable in bed in NAD. Still with cough but no significant sob. Doing well on NC oxygen    INTERVAL HPI/OVERNIGHT EVENTS:      VITAL SIGNS:  T(F): 97.2 (01-30-21 @ 08:48)  HR: 66 (01-30-21 @ 08:48)  BP: 143/58 (01-30-21 @ 08:48)  RR: 18 (01-30-21 @ 08:48)  SpO2: 98% (01-30-21 @ 08:48)  Wt(kg): --  I&O's Detail    29 Jan 2021 07:01  -  30 Jan 2021 07:00  --------------------------------------------------------  IN:  Total IN: 0 mL    OUT:    Voided (mL): 3 mL  Total OUT: 3 mL    Total NET: -3 mL              REVIEW OF SYSTEMS:    CONSTITUTIONAL:  No fevers, chills, sweats    HEENT:  Eyes:  No diplopia or blurred vision. ENT:  No earache, sore throat or runny nose.    CARDIOVASCULAR:  No pressure, squeezing, tightness, or heaviness about the chest; no palpitations.    RESPIRATORY:  Per HPI    GASTROINTESTINAL:  No abdominal pain, nausea, vomiting or diarrhea.    GENITOURINARY:  No dysuria, frequency or urgency.    NEUROLOGIC:  No paresthesias, fasciculations, seizures or weakness.    PSYCHIATRIC:  No disorder of thought or mood.      PHYSICAL EXAM:    Constitutional: Well developed and nourished  Eyes:Perrla  ENMT: normal  Neck:supple  Respiratory: good air entry  Cardiovascular: S1 S2 regular  Gastrointestinal: Soft, Non tender  Extremities: No edema  Vascular:normal  Neurological:Awake, alert,Ox3  Musculoskeletal:Normal      MEDICATIONS  (STANDING):  ascorbic acid 1000 milliGRAM(s) Oral daily  aspirin  chewable 81 milliGRAM(s) Oral daily  atorvastatin 40 milliGRAM(s) Oral at bedtime  azithromycin  IVPB 500 milliGRAM(s) IV Intermittent every 24 hours  cholecalciferol 2000 Unit(s) Oral every 24 hours  clopidogrel Tablet 75 milliGRAM(s) Oral daily  dexAMETHasone  Injectable 6 milliGRAM(s) IV Push daily  dextrose 40% Gel 15 Gram(s) Oral once  dextrose 5%. 1000 milliLiter(s) (50 mL/Hr) IV Continuous <Continuous>  dextrose 5%. 1000 milliLiter(s) (100 mL/Hr) IV Continuous <Continuous>  enoxaparin Injectable 40 milliGRAM(s) SubCutaneous daily  famotidine    Tablet 40 milliGRAM(s) Oral two times a day  glucagon  Injectable 1 milliGRAM(s) IntraMuscular once  insulin glargine Injectable (LANTUS) 45 Unit(s) SubCutaneous at bedtime  insulin lispro (ADMELOG) corrective regimen sliding scale   SubCutaneous Before meals and at bedtime  insulin lispro Injectable (ADMELOG) 10 Unit(s) SubCutaneous three times a day before meals  montelukast 10 milliGRAM(s) Oral every 24 hours  senna 2 Tablet(s) Oral at bedtime  valsartan 160 milliGRAM(s) Oral daily  zinc sulfate 220 milliGRAM(s) Oral daily    MEDICATIONS  (PRN):  ALBUTerol    90 MICROgram(s) HFA Inhaler 2 Puff(s) Inhalation every 6 hours PRN Bronchospasm  guaiFENesin   Syrup  (Sugar-Free) 200 milliGRAM(s) Oral every 6 hours PRN Cough  sodium chloride 0.65% Nasal 1 Spray(s) Both Nostrils four times a day PRN Nasal Congestion      Allergies    Cipro (Short breath)  penicillin (Rash)    Intolerances        LABS:                        12.2   7.48  )-----------( 284      ( 30 Jan 2021 09:57 )             37.7     01-30    140  |  104  |  22<H>  ----------------------------<  142<H>  3.7   |  31  |  0.53    Ca    9.5      30 Jan 2021 09:57  Phos  2.7     01-30  Mg     2.0     01-30    TPro  6.8  /  Alb  2.6<L>  /  TBili  0.3  /  DBili  x   /  AST  19  /  ALT  58  /  AlkPhos  78  01-30              CAPILLARY BLOOD GLUCOSE      POCT Blood Glucose.: 149 mg/dL (30 Jan 2021 08:30)  POCT Blood Glucose.: 313 mg/dL (29 Jan 2021 21:04)  POCT Blood Glucose.: 282 mg/dL (29 Jan 2021 16:49)  POCT Blood Glucose.: 341 mg/dL (29 Jan 2021 11:16)    pro-bnp -- 01-27 @ 16:59     d-dimer 433  01-27 @ 16:59      RADIOLOGY & ADDITIONAL TESTS:    CXR:    < from: Xray Chest 1 View-PORTABLE IMMEDIATE (01.27.21 @ 17:00) >  IMPRESSION:   RIGHT lung base linear atelectasis/airspace disease..    < end of copied text >  Ct scan chest:    ekg;    echo:

## 2021-01-30 NOTE — PROGRESS NOTE ADULT - SUBJECTIVE AND OBJECTIVE BOX
Patient is a 55y old  Female who presents with a chief complaint of fever chills (29 Jan 2021 20:09)    pt seen in icu [  ], reg med floor [ x  ], bed [ x ], chair at bedside [   ], a+o x3 [ x ], lethargic [  ],    nad [x  ]    Allergies    Cipro (Short breath)  penicillin (Rash)        Vitals    T(F): 97.2 (01-30-21 @ 05:45), Max: 97.8 (01-29-21 @ 16:16)  HR: 87 (01-30-21 @ 05:45) (69 - 87)  BP: 153/74 (01-30-21 @ 05:45) (142/66 - 153/74)  RR: 19 (01-30-21 @ 05:45) (16 - 19)  SpO2: 93% (01-30-21 @ 05:45) (90% - 98%)  Wt(kg): --  CAPILLARY BLOOD GLUCOSE      POCT Blood Glucose.: 313 mg/dL (29 Jan 2021 21:04)      Labs                          12.2   4.92  )-----------( 221      ( 29 Jan 2021 07:46 )             37.6       01-29    136  |  103  |  18  ----------------------------<  304<H>  4.4   |  28  |  0.61    Ca    10.0      29 Jan 2021 07:46  Phos  2.5     01-29  Mg     2.2     01-29    TPro  6.8  /  Alb  2.6<L>  /  TBili  0.3  /  DBili  x   /  AST  18  /  ALT  58  /  AlkPhos  82  01-29      CARDIAC MARKERS ( 28 Jan 2021 08:42 )  <0.015 ng/mL / x     / 45 U/L / x     / x            .Blood Blood-Peripheral  01-27 @ 22:01   No growth to date.  --  --          Radiology Results      Meds    MEDICATIONS  (STANDING):  ascorbic acid 1000 milliGRAM(s) Oral daily  aspirin  chewable 81 milliGRAM(s) Oral daily  atorvastatin 40 milliGRAM(s) Oral at bedtime  azithromycin  IVPB 500 milliGRAM(s) IV Intermittent every 24 hours  cholecalciferol 2000 Unit(s) Oral every 24 hours  clopidogrel Tablet 75 milliGRAM(s) Oral daily  dexAMETHasone  Injectable 6 milliGRAM(s) IV Push daily  dextrose 40% Gel 15 Gram(s) Oral once  dextrose 5%. 1000 milliLiter(s) (100 mL/Hr) IV Continuous <Continuous>  dextrose 5%. 1000 milliLiter(s) (50 mL/Hr) IV Continuous <Continuous>  enoxaparin Injectable 40 milliGRAM(s) SubCutaneous daily  famotidine    Tablet 40 milliGRAM(s) Oral two times a day  glucagon  Injectable 1 milliGRAM(s) IntraMuscular once  insulin glargine Injectable (LANTUS) 45 Unit(s) SubCutaneous at bedtime  insulin lispro (ADMELOG) corrective regimen sliding scale   SubCutaneous Before meals and at bedtime  insulin lispro Injectable (ADMELOG) 10 Unit(s) SubCutaneous three times a day before meals  montelukast 10 milliGRAM(s) Oral every 24 hours  senna 2 Tablet(s) Oral at bedtime  valsartan 160 milliGRAM(s) Oral daily  zinc sulfate 220 milliGRAM(s) Oral daily      MEDICATIONS  (PRN):  ALBUTerol    90 MICROgram(s) HFA Inhaler 2 Puff(s) Inhalation every 6 hours PRN Bronchospasm  guaiFENesin   Syrup  (Sugar-Free) 200 milliGRAM(s) Oral every 6 hours PRN Cough  sodium chloride 0.65% Nasal 1 Spray(s) Both Nostrils four times a day PRN Nasal Congestion      Physical Exam    Neuro :  no focal deficits  Respiratory: CTA B/L  CV: RRR, S1S2, no murmurs,   Abdominal: Soft, NT, ND +BS,  Extremities: No edema, + peripheral pulses    ASSESSMENT    right lower lobe pneumonia possibly bacterial,   covid 19 infection,   h/o NIDDM,   HTN,   CAD (coronary artery disease)  Fibromyalgia  cholecystectomy  appendectomy  tonsillectomy        PLAN    cont zithromax,   id f/u  f/u ucx   blood cx neg noted above  no remdesivir given covid ab neg noted above  cont decadron,   cont vit c, vitamin d, zinc, pepcid, singulair,   contact and airborne isolation,   pulm f/u  cont albuterol inhaler,   afebrile   cont tylenol prn,   cont robitussin prn   O2 sat (91% - 98%) on ra  O2 via nasal canula as needed  procalcitonin, D-dimer, esr, crp, ldh, ferritin, lactate noted above  f/u rept markers in 2 days,   hgba1c 9.6   endo cons   cont lantus 35 qhs and lispro 2 actid  cont lispro ss  cont current meds         Patient is a 55y old  Female who presents with a chief complaint of fever chills (29 Jan 2021 20:09)    pt seen in icu [  ], reg med floor [ x  ], bed [ x ], chair at bedside [   ], a+o x3 [ x ], lethargic [  ],    nad [x  ]    Allergies    Cipro (Short breath)  penicillin (Rash)        Vitals    T(F): 97.2 (01-30-21 @ 05:45), Max: 97.8 (01-29-21 @ 16:16)  HR: 87 (01-30-21 @ 05:45) (69 - 87)  BP: 153/74 (01-30-21 @ 05:45) (142/66 - 153/74)  RR: 19 (01-30-21 @ 05:45) (16 - 19)  SpO2: 93% (01-30-21 @ 05:45) (90% - 98%)  Wt(kg): --  CAPILLARY BLOOD GLUCOSE      POCT Blood Glucose.: 313 mg/dL (29 Jan 2021 21:04)      Labs                          12.2   4.92  )-----------( 221      ( 29 Jan 2021 07:46 )             37.6       01-29    136  |  103  |  18  ----------------------------<  304<H>  4.4   |  28  |  0.61    Ca    10.0      29 Jan 2021 07:46  Phos  2.5     01-29  Mg     2.2     01-29    TPro  6.8  /  Alb  2.6<L>  /  TBili  0.3  /  DBili  x   /  AST  18  /  ALT  58  /  AlkPhos  82  01-29      CARDIAC MARKERS ( 28 Jan 2021 08:42 )  <0.015 ng/mL / x     / 45 U/L / x     / x            .Blood Blood-Peripheral  01-27 @ 22:01   No growth to date.  --  --          Radiology Results      Meds    MEDICATIONS  (STANDING):  ascorbic acid 1000 milliGRAM(s) Oral daily  aspirin  chewable 81 milliGRAM(s) Oral daily  atorvastatin 40 milliGRAM(s) Oral at bedtime  azithromycin  IVPB 500 milliGRAM(s) IV Intermittent every 24 hours  cholecalciferol 2000 Unit(s) Oral every 24 hours  clopidogrel Tablet 75 milliGRAM(s) Oral daily  dexAMETHasone  Injectable 6 milliGRAM(s) IV Push daily  dextrose 40% Gel 15 Gram(s) Oral once  dextrose 5%. 1000 milliLiter(s) (100 mL/Hr) IV Continuous <Continuous>  dextrose 5%. 1000 milliLiter(s) (50 mL/Hr) IV Continuous <Continuous>  enoxaparin Injectable 40 milliGRAM(s) SubCutaneous daily  famotidine    Tablet 40 milliGRAM(s) Oral two times a day  glucagon  Injectable 1 milliGRAM(s) IntraMuscular once  insulin glargine Injectable (LANTUS) 45 Unit(s) SubCutaneous at bedtime  insulin lispro (ADMELOG) corrective regimen sliding scale   SubCutaneous Before meals and at bedtime  insulin lispro Injectable (ADMELOG) 10 Unit(s) SubCutaneous three times a day before meals  montelukast 10 milliGRAM(s) Oral every 24 hours  senna 2 Tablet(s) Oral at bedtime  valsartan 160 milliGRAM(s) Oral daily  zinc sulfate 220 milliGRAM(s) Oral daily      MEDICATIONS  (PRN):  ALBUTerol    90 MICROgram(s) HFA Inhaler 2 Puff(s) Inhalation every 6 hours PRN Bronchospasm  guaiFENesin   Syrup  (Sugar-Free) 200 milliGRAM(s) Oral every 6 hours PRN Cough  sodium chloride 0.65% Nasal 1 Spray(s) Both Nostrils four times a day PRN Nasal Congestion      Physical Exam    Neuro :  no focal deficits  Respiratory: CTA B/L  CV: RRR, S1S2, no murmurs,   Abdominal: Soft, NT, ND +BS,  Extremities: No edema, + peripheral pulses    ASSESSMENT    right lower lobe pneumonia possibly bacterial,   covid 19 infection,   h/o NIDDM,   HTN,   CAD (coronary artery disease)  Fibromyalgia  cholecystectomy  appendectomy  tonsillectomy        PLAN    cont zithromax,   id f/u  f/u ucx   blood cx neg noted above  no remdesivir given covid ab neg noted    cont decadron,   cont vit c, vitamin d, zinc, pepcid, singulair,   contact and airborne isolation,   pulm f/u  cont albuterol inhaler,   afebrile   cont tylenol prn,   cont robitussin prn   O2 sat (90% - 98%) on 4L n/c  O2 via nasal canula and taper as tolerated  procalcitonin, D-dimer, esr, crp, ldh, ferritin, lactate noted   f/u rept markers in 2 days,   hgba1c 9.6   endo f/u   lantus incr to 45u qhs  premeal incr to admelog to 10 u  cont HSS  cont current meds

## 2021-01-30 NOTE — PROGRESS NOTE ADULT - ASSESSMENT
Patient is a 55y old  Female who is post menopausal, with PMHx of NIDDM and HTN, presents to the ER for evaluation of fever, chills, and myalgias and diagnosed with COVID 19 since 1/19/21. She started having dry cough, worsening chills, and a few episodes of diarrhea. Patient endorses loss of smell and loss of appetite but denies chest pain, or any other acute complaints. On admission, she found to have low grade fever, tachycardia and tachypnea with hypoxia, and now saturating well on supplemental oxygenation via NC. She has started on Dexamethasone and the ID consult requested to assist with further management.     # Acute Respiratory failure - most  likely due to COVID -19  # SARS COV-2- Not a candidate for Remdesivir since titers is positive    would recommend:    1. Please wean off oxygen as tolerated   2. Continue Dexamethasone to complete the course OR until discharge   3. Supplemental oxygenation and bronchodilator as needed  4. Continue supportive care including AC  5. COVID precautions     d/w  Nursing staff       Attending Attestation:    Spent more than 35 minutes on total encounter, more than 50 % of the visit was spent counseling and/or coordinating care by the Attending physician. Patient is a 55y old  Female who is post menopausal, with PMHx of NIDDM and HTN, presents to the ER for evaluation of fever, chills, and myalgias and diagnosed with COVID 19 since 1/19/21. She started having dry cough, worsening chills, and a few episodes of diarrhea. Patient endorses loss of smell and loss of appetite but denies chest pain, or any other acute complaints. On admission, she found to have low grade fever, tachycardia and tachypnea with hypoxia, and now saturating well on supplemental oxygenation via NC. She has started on Dexamethasone and the ID consult requested to assist with further management.     # Acute Respiratory failure - most  likely due to COVID -19  # SARS COV-2- Not a candidate for Remdesivir since titers is positive    would recommend:    1. Monitor saturation  when ambulate   2. Continue Dexamethasone to complete the course OR until discharge   3. Supplemental oxygenation and bronchodilator as needed  4. Continue supportive care including AC  5. COVID precautions     d/w  Nursing staff       Attending Attestation:    Spent more than 35 minutes on total encounter, more than 50 % of the visit was spent counseling and/or coordinating care by the Attending physician.

## 2021-01-30 NOTE — PROGRESS NOTE ADULT - PROBLEM SELECTOR PLAN 4
pt on BBk, asa, plavix and statin   continue with home medications

## 2021-01-30 NOTE — PROGRESS NOTE ADULT - PROBLEM SELECTOR PLAN 1
p/w SOB, cough   - WBC: WNL, lymphocyte; WNl   -  on decadron day 3  covid Ab pos so did not start remdsvir  - CXR RLL infiltrate  - Will c/w  Azithro to cover for CAp.   - contact and airborne isolation precaution   - Blood culture neg till date  - Tylenol, Albuterol Inhaler for sxs tx

## 2021-01-30 NOTE — PROGRESS NOTE ADULT - SUBJECTIVE AND OBJECTIVE BOX
PGY-1 Progress Note discussed with attending    PAGER #: [[phone]] TILL 5:00 PM  PLEASE CONTACT ON CALL TEAM:  - On Call Team (Please refer to Trinh) FROM 5:00 PM - 8:30PM  - Nightfloat Team FROM 8:30 -7:30 AM    CHIEF COMPLAINT & BRIEF HOSPITAL COURSE:55 year old female, post menopausal, with PMHx of NIDDM and HTN presenting with fever, chills, and myalgias and diagnosis of COVID 19 since 1/19. Pt states she started having dry cough 3 days ago and worsening of chills. Pt also had a few episodes of diarrhea. Patient endorses loss of smell and loss of appetite but denies chest pain, or any other acute complaints.Patient was found to be COVID positive and was started on supportive measures and decadron , Covid AB came back positive . patient was also started on azithromycin for community acquired pneumonia treatment    INTERVAL HPI/OVERNIGHT EVENTS:   patient examined bedisde, she is comfortable and feeling better , saturating well on 4 L NC , will try to titrate.     REVIEW OF SYSTEMS:  CONSTITUTIONAL: No fever, weight loss, or fatigue  RESPIRATORY: No cough, wheezing, chills or hemoptysis; No shortness of breath  CARDIOVASCULAR: No chest pain, palpitations, dizziness, or leg swelling  GASTROINTESTINAL: No abdominal pain. No nausea, vomiting, or hematemesis; No diarrhea or constipation. No melena or hematochezia.  GENITOURINARY: No dysuria or hematuria, urinary frequency  NEUROLOGICAL: No headaches, memory loss, loss of strength, numbness, or tremors  SKIN: No itching, burning, rashes, or lesions     MEDICATIONS  (STANDING):  ascorbic acid 1000 milliGRAM(s) Oral daily  aspirin  chewable 81 milliGRAM(s) Oral daily  atorvastatin 40 milliGRAM(s) Oral at bedtime  azithromycin  IVPB 500 milliGRAM(s) IV Intermittent every 24 hours  cholecalciferol 2000 Unit(s) Oral every 24 hours  clopidogrel Tablet 75 milliGRAM(s) Oral daily  dexAMETHasone  Injectable 6 milliGRAM(s) IV Push daily  dextrose 40% Gel 15 Gram(s) Oral once  dextrose 5%. 1000 milliLiter(s) (50 mL/Hr) IV Continuous <Continuous>  dextrose 5%. 1000 milliLiter(s) (100 mL/Hr) IV Continuous <Continuous>  enoxaparin Injectable 40 milliGRAM(s) SubCutaneous daily  famotidine    Tablet 40 milliGRAM(s) Oral two times a day  glucagon  Injectable 1 milliGRAM(s) IntraMuscular once  insulin glargine Injectable (LANTUS) 45 Unit(s) SubCutaneous at bedtime  insulin lispro (ADMELOG) corrective regimen sliding scale   SubCutaneous Before meals and at bedtime  insulin lispro Injectable (ADMELOG) 10 Unit(s) SubCutaneous three times a day before meals  montelukast 10 milliGRAM(s) Oral every 24 hours  senna 2 Tablet(s) Oral at bedtime  valsartan 160 milliGRAM(s) Oral daily  zinc sulfate 220 milliGRAM(s) Oral daily    MEDICATIONS  (PRN):  ALBUTerol    90 MICROgram(s) HFA Inhaler 2 Puff(s) Inhalation every 6 hours PRN Bronchospasm  guaiFENesin   Syrup  (Sugar-Free) 200 milliGRAM(s) Oral every 6 hours PRN Cough  sodium chloride 0.65% Nasal 1 Spray(s) Both Nostrils four times a day PRN Nasal Congestion      Vital Signs Last 24 Hrs  T(C): 36.2 (30 Jan 2021 05:45), Max: 36.6 (29 Jan 2021 16:16)  T(F): 97.2 (30 Jan 2021 05:45), Max: 97.8 (29 Jan 2021 16:16)  HR: 87 (30 Jan 2021 05:45) (69 - 87)  BP: 153/74 (30 Jan 2021 05:45) (142/66 - 153/74)  BP(mean): --  RR: 19 (30 Jan 2021 05:45) (16 - 19)  SpO2: 93% (30 Jan 2021 05:45) (92% - 98%)    PHYSICAL EXAMINATION:  GENERAL: NAD, well built, on NC  HEAD:  Atraumatic, Normocephalic  EYES:  conjunctiva and sclera clear  NECK: Supple, No JVD, Normal thyroid  CHEST/LUNG: Clear to auscultation. Clear to percussion bilaterally; No rales, rhonchi, wheezing, or rubs  HEART: Regular rate and rhythm; No murmurs, rubs, or gallops  ABDOMEN: Soft, Nontender, Nondistended; Bowel sounds present  NERVOUS SYSTEM:  Alert & Oriented X3,    EXTREMITIES:  2+ Peripheral Pulses, No clubbing, cyanosis, or edema  SKIN: warm dry                          12.2   4.92  )-----------( 221      ( 29 Jan 2021 07:46 )             37.6     01-29    136  |  103  |  18  ----------------------------<  304<H>  4.4   |  28  |  0.61    Ca    10.0      29 Jan 2021 07:46  Phos  2.5     01-29  Mg     2.2     01-29    TPro  6.8  /  Alb  2.6<L>  /  TBili  0.3  /  DBili  x   /  AST  18  /  ALT  58  /  AlkPhos  82  01-29    LIVER FUNCTIONS - ( 29 Jan 2021 07:46 )  Alb: 2.6 g/dL / Pro: 6.8 g/dL / ALK PHOS: 82 U/L / ALT: 58 U/L DA / AST: 18 U/L / GGT: x                       CAPILLARY BLOOD GLUCOSE  CAPILLARY BLOOD GLUCOSE      POCT Blood Glucose.: 149 mg/dL (30 Jan 2021 08:30)    CAPILLARY BLOOD GLUCOSE      POCT Blood Glucose.: 149 mg/dL (30 Jan 2021 08:30)  POCT Blood Glucose.: 313 mg/dL (29 Jan 2021 21:04)  POCT Blood Glucose.: 282 mg/dL (29 Jan 2021 16:49)  POCT Blood Glucose.: 341 mg/dL (29 Jan 2021 11:16)      RADIOLOGY & ADDITIONAL TESTS:

## 2021-01-31 ENCOUNTER — TRANSCRIPTION ENCOUNTER (OUTPATIENT)
Age: 56
End: 2021-01-31

## 2021-01-31 VITALS
OXYGEN SATURATION: 95 % | RESPIRATION RATE: 16 BRPM | DIASTOLIC BLOOD PRESSURE: 56 MMHG | SYSTOLIC BLOOD PRESSURE: 155 MMHG | TEMPERATURE: 98 F | HEART RATE: 72 BPM

## 2021-01-31 LAB
ALBUMIN SERPL ELPH-MCNC: 2.7 G/DL — LOW (ref 3.5–5)
ALP SERPL-CCNC: 76 U/L — SIGNIFICANT CHANGE UP (ref 40–120)
ALT FLD-CCNC: 70 U/L DA — HIGH (ref 10–60)
ANION GAP SERPL CALC-SCNC: 4 MMOL/L — LOW (ref 5–17)
AST SERPL-CCNC: 39 U/L — SIGNIFICANT CHANGE UP (ref 10–40)
BILIRUB SERPL-MCNC: 0.3 MG/DL — SIGNIFICANT CHANGE UP (ref 0.2–1.2)
BUN SERPL-MCNC: 21 MG/DL — HIGH (ref 7–18)
CALCIUM SERPL-MCNC: 9.4 MG/DL — SIGNIFICANT CHANGE UP (ref 8.4–10.5)
CHLORIDE SERPL-SCNC: 104 MMOL/L — SIGNIFICANT CHANGE UP (ref 96–108)
CO2 SERPL-SCNC: 33 MMOL/L — HIGH (ref 22–31)
CREAT SERPL-MCNC: 0.56 MG/DL — SIGNIFICANT CHANGE UP (ref 0.5–1.3)
GLUCOSE BLDC GLUCOMTR-MCNC: 183 MG/DL — HIGH (ref 70–99)
GLUCOSE BLDC GLUCOMTR-MCNC: 93 MG/DL — SIGNIFICANT CHANGE UP (ref 70–99)
GLUCOSE SERPL-MCNC: 93 MG/DL — SIGNIFICANT CHANGE UP (ref 70–99)
HCT VFR BLD CALC: 38.1 % — SIGNIFICANT CHANGE UP (ref 34.5–45)
HGB BLD-MCNC: 12.4 G/DL — SIGNIFICANT CHANGE UP (ref 11.5–15.5)
MAGNESIUM SERPL-MCNC: 2 MG/DL — SIGNIFICANT CHANGE UP (ref 1.6–2.6)
MCHC RBC-ENTMCNC: 30.2 PG — SIGNIFICANT CHANGE UP (ref 27–34)
MCHC RBC-ENTMCNC: 32.5 GM/DL — SIGNIFICANT CHANGE UP (ref 32–36)
MCV RBC AUTO: 92.7 FL — SIGNIFICANT CHANGE UP (ref 80–100)
NRBC # BLD: 0 /100 WBCS — SIGNIFICANT CHANGE UP (ref 0–0)
PHOSPHATE SERPL-MCNC: 3.1 MG/DL — SIGNIFICANT CHANGE UP (ref 2.5–4.5)
PLATELET # BLD AUTO: 316 K/UL — SIGNIFICANT CHANGE UP (ref 150–400)
POTASSIUM SERPL-MCNC: 3.2 MMOL/L — LOW (ref 3.5–5.3)
POTASSIUM SERPL-SCNC: 3.2 MMOL/L — LOW (ref 3.5–5.3)
PROT SERPL-MCNC: 6.7 G/DL — SIGNIFICANT CHANGE UP (ref 6–8.3)
RBC # BLD: 4.11 M/UL — SIGNIFICANT CHANGE UP (ref 3.8–5.2)
RBC # FLD: 12.1 % — SIGNIFICANT CHANGE UP (ref 10.3–14.5)
SODIUM SERPL-SCNC: 141 MMOL/L — SIGNIFICANT CHANGE UP (ref 135–145)
WBC # BLD: 6.39 K/UL — SIGNIFICANT CHANGE UP (ref 3.8–10.5)
WBC # FLD AUTO: 6.39 K/UL — SIGNIFICANT CHANGE UP (ref 3.8–10.5)

## 2021-01-31 PROCEDURE — U0005: CPT

## 2021-01-31 PROCEDURE — 84443 ASSAY THYROID STIM HORMONE: CPT

## 2021-01-31 PROCEDURE — 87635 SARS-COV-2 COVID-19 AMP PRB: CPT

## 2021-01-31 PROCEDURE — 82728 ASSAY OF FERRITIN: CPT

## 2021-01-31 PROCEDURE — 71045 X-RAY EXAM CHEST 1 VIEW: CPT

## 2021-01-31 PROCEDURE — 85025 COMPLETE CBC W/AUTO DIFF WBC: CPT

## 2021-01-31 PROCEDURE — 82962 GLUCOSE BLOOD TEST: CPT

## 2021-01-31 PROCEDURE — 85027 COMPLETE CBC AUTOMATED: CPT

## 2021-01-31 PROCEDURE — 83605 ASSAY OF LACTIC ACID: CPT

## 2021-01-31 PROCEDURE — 86140 C-REACTIVE PROTEIN: CPT

## 2021-01-31 PROCEDURE — 84484 ASSAY OF TROPONIN QUANT: CPT

## 2021-01-31 PROCEDURE — 83735 ASSAY OF MAGNESIUM: CPT

## 2021-01-31 PROCEDURE — 82607 VITAMIN B-12: CPT

## 2021-01-31 PROCEDURE — 80061 LIPID PANEL: CPT

## 2021-01-31 PROCEDURE — 99285 EMERGENCY DEPT VISIT HI MDM: CPT

## 2021-01-31 PROCEDURE — 85652 RBC SED RATE AUTOMATED: CPT

## 2021-01-31 PROCEDURE — 96374 THER/PROPH/DIAG INJ IV PUSH: CPT

## 2021-01-31 PROCEDURE — 82746 ASSAY OF FOLIC ACID SERUM: CPT

## 2021-01-31 PROCEDURE — 94640 AIRWAY INHALATION TREATMENT: CPT

## 2021-01-31 PROCEDURE — 84145 PROCALCITONIN (PCT): CPT

## 2021-01-31 PROCEDURE — 83615 LACTATE (LD) (LDH) ENZYME: CPT

## 2021-01-31 PROCEDURE — 80048 BASIC METABOLIC PNL TOTAL CA: CPT

## 2021-01-31 PROCEDURE — 84100 ASSAY OF PHOSPHORUS: CPT

## 2021-01-31 PROCEDURE — 93005 ELECTROCARDIOGRAM TRACING: CPT

## 2021-01-31 PROCEDURE — 87040 BLOOD CULTURE FOR BACTERIA: CPT

## 2021-01-31 PROCEDURE — 96375 TX/PRO/DX INJ NEW DRUG ADDON: CPT

## 2021-01-31 PROCEDURE — 82550 ASSAY OF CK (CPK): CPT

## 2021-01-31 PROCEDURE — 36415 COLL VENOUS BLD VENIPUNCTURE: CPT

## 2021-01-31 PROCEDURE — 80053 COMPREHEN METABOLIC PANEL: CPT

## 2021-01-31 PROCEDURE — 86769 SARS-COV-2 COVID-19 ANTIBODY: CPT

## 2021-01-31 PROCEDURE — 96361 HYDRATE IV INFUSION ADD-ON: CPT

## 2021-01-31 PROCEDURE — 83036 HEMOGLOBIN GLYCOSYLATED A1C: CPT

## 2021-01-31 PROCEDURE — 83540 ASSAY OF IRON: CPT

## 2021-01-31 PROCEDURE — 85379 FIBRIN DEGRADATION QUANT: CPT

## 2021-01-31 PROCEDURE — 85384 FIBRINOGEN ACTIVITY: CPT

## 2021-01-31 RX ORDER — CHOLECALCIFEROL (VITAMIN D3) 125 MCG
1 CAPSULE ORAL
Qty: 15 | Refills: 0
Start: 2021-01-31 | End: 2021-02-14

## 2021-01-31 RX ORDER — CHOLECALCIFEROL (VITAMIN D3) 125 MCG
2000 CAPSULE ORAL
Qty: 30000 | Refills: 0
Start: 2021-01-31 | End: 2021-02-14

## 2021-01-31 RX ORDER — FAMOTIDINE 10 MG/ML
1 INJECTION INTRAVENOUS
Qty: 15 | Refills: 0
Start: 2021-01-31 | End: 2021-02-14

## 2021-01-31 RX ORDER — INSULIN LISPRO 100/ML
5 VIAL (ML) SUBCUTANEOUS
Qty: 300 | Refills: 0
Start: 2021-01-31 | End: 2021-03-01

## 2021-01-31 RX ORDER — ZINC SULFATE TAB 220 MG (50 MG ZINC EQUIVALENT) 220 (50 ZN) MG
1 TAB ORAL
Qty: 15 | Refills: 0
Start: 2021-01-31 | End: 2021-02-14

## 2021-01-31 RX ORDER — SENNA PLUS 8.6 MG/1
2 TABLET ORAL
Qty: 10 | Refills: 0
Start: 2021-01-31 | End: 2021-02-04

## 2021-01-31 RX ORDER — MONTELUKAST 4 MG/1
1 TABLET, CHEWABLE ORAL
Qty: 15 | Refills: 0
Start: 2021-01-31 | End: 2021-02-14

## 2021-01-31 RX ORDER — INSULIN ASPART 100 [IU]/ML
5 INJECTION, SOLUTION SUBCUTANEOUS
Qty: 450 | Refills: 0
Start: 2021-01-31 | End: 2021-03-01

## 2021-01-31 RX ORDER — AZITHROMYCIN 500 MG/1
1 TABLET, FILM COATED ORAL
Qty: 2 | Refills: 0
Start: 2021-01-31 | End: 2021-02-01

## 2021-01-31 RX ORDER — INSULIN LISPRO 100/ML
5 VIAL (ML) SUBCUTANEOUS
Qty: 450 | Refills: 0
Start: 2021-01-31 | End: 2021-03-01

## 2021-01-31 RX ORDER — AZITHROMYCIN 500 MG/1
1 TABLET, FILM COATED ORAL
Qty: 4 | Refills: 0
Start: 2021-01-31 | End: 2021-02-01

## 2021-01-31 RX ORDER — DEXAMETHASONE 0.5 MG/5ML
3 ELIXIR ORAL
Qty: 18 | Refills: 0
Start: 2021-01-31 | End: 2021-02-05

## 2021-01-31 RX ORDER — ALBUTEROL 90 UG/1
2 AEROSOL, METERED ORAL
Qty: 120 | Refills: 0
Start: 2021-01-31 | End: 2021-02-14

## 2021-01-31 RX ORDER — ASCORBIC ACID 60 MG
1 TABLET,CHEWABLE ORAL
Qty: 15 | Refills: 0
Start: 2021-01-31 | End: 2021-02-14

## 2021-01-31 RX ADMIN — AZITHROMYCIN 255 MILLIGRAM(S): 500 TABLET, FILM COATED ORAL at 08:09

## 2021-01-31 RX ADMIN — MONTELUKAST 10 MILLIGRAM(S): 4 TABLET, CHEWABLE ORAL at 08:09

## 2021-01-31 RX ADMIN — VALSARTAN 160 MILLIGRAM(S): 80 TABLET ORAL at 05:54

## 2021-01-31 RX ADMIN — Medication 10 UNIT(S): at 12:16

## 2021-01-31 RX ADMIN — FAMOTIDINE 40 MILLIGRAM(S): 10 INJECTION INTRAVENOUS at 05:54

## 2021-01-31 RX ADMIN — Medication 2000 UNIT(S): at 08:09

## 2021-01-31 RX ADMIN — Medication 1000 MILLIGRAM(S): at 12:11

## 2021-01-31 RX ADMIN — ENOXAPARIN SODIUM 40 MILLIGRAM(S): 100 INJECTION SUBCUTANEOUS at 12:15

## 2021-01-31 RX ADMIN — Medication 10 UNIT(S): at 08:10

## 2021-01-31 RX ADMIN — Medication 1: at 12:11

## 2021-01-31 RX ADMIN — ZINC SULFATE TAB 220 MG (50 MG ZINC EQUIVALENT) 220 MILLIGRAM(S): 220 (50 ZN) TAB at 08:09

## 2021-01-31 RX ADMIN — Medication 6 MILLIGRAM(S): at 05:54

## 2021-01-31 RX ADMIN — Medication 81 MILLIGRAM(S): at 12:11

## 2021-01-31 RX ADMIN — CLOPIDOGREL BISULFATE 75 MILLIGRAM(S): 75 TABLET, FILM COATED ORAL at 12:15

## 2021-01-31 NOTE — PROGRESS NOTE ADULT - SUBJECTIVE AND OBJECTIVE BOX
Patient is seen and examined at the bed side, is afebrile.  She remains OFF oxygen and saturating well at room air.       REVIEW OF SYSTEMS: All other review systems are negative      ALLERGIES: Cipro (Short breath)  and penicillin (Rash)      Vital Signs Last 24 Hrs  T(C): 36.6 (31 Jan 2021 08:14), Max: 36.6 (31 Jan 2021 08:14)  T(F): 97.9 (31 Jan 2021 08:14), Max: 97.9 (31 Jan 2021 08:14)  HR: 72 (31 Jan 2021 08:14) (61 - 75)  BP: 155/56 (31 Jan 2021 08:14) (139/59 - 164/56)  BP(mean): --  RR: 16 (31 Jan 2021 08:14) (15 - 18)  SpO2: 95% (31 Jan 2021 08:14) (95% - 97%)      PHYSICAL EXAM:  GENERAL: Not in distress, oFF oxygen   CHEST/LUNG: Not using accessory muscles   HEART: s1 and s2 present  ABDOMEN:  Nontender and  Nondistended  EXTREMITIES: No pedal  edema  CNS: Awake and Alert      LABS:                        12.4   6.39  )-----------( 316      ( 31 Jan 2021 08:23 )             38.1                           12.2   7.48  )-----------( 284      ( 30 Jan 2021 09:57 )             37.7       01-31    141  |  104  |  21<H>  ----------------------------<  93  3.2<L>   |  33<H>  |  0.56    Ca    9.4      31 Jan 2021 08:23  Phos  3.1     01-31  Mg     2.0     01-31    TPro  6.7  /  Alb  2.7<L>  /  TBili  0.3  /  DBili  x   /  AST  39  /  ALT  70<H>  /  AlkPhos  76  01-31 01-30    140  |  104  |  22<H>  ----------------------------<  142<H>  3.7   |  31  |  0.53    Ca    9.5      30 Jan 2021 09:57  Phos  2.7     01-30  Mg     2.0     01-30    TPro  6.8  /  Alb  2.6<L>  /  TBili  0.3  /  DBili  x   /  AST  19  /  ALT  58  /  AlkPhos  78  01-30        CAPILLARY BLOOD GLUCOSE  POCT Blood Glucose.: 288 mg/dL (28 Jan 2021 21:16)  POCT Blood Glucose.: 261 mg/dL (28 Jan 2021 16:40)  POCT Blood Glucose.: 322 mg/dL (28 Jan 2021 )        MEDICATIONS  (STANDING):    ascorbic acid 1000 milliGRAM(s) Oral daily  aspirin  chewable 81 milliGRAM(s) Oral daily  atorvastatin 40 milliGRAM(s) Oral at bedtime  azithromycin  IVPB 500 milliGRAM(s) IV Intermittent every 24 hours  cholecalciferol 2000 Unit(s) Oral every 24 hours  clopidogrel Tablet 75 milliGRAM(s) Oral daily  dexAMETHasone  Injectable 6 milliGRAM(s) IV Push daily  dextrose 40% Gel 15 Gram(s) Oral once  dextrose 5%. 1000 milliLiter(s) (50 mL/Hr) IV Continuous <Continuous>  dextrose 5%. 1000 milliLiter(s) (100 mL/Hr) IV Continuous <Continuous>  enoxaparin Injectable 40 milliGRAM(s) SubCutaneous daily  famotidine    Tablet 40 milliGRAM(s) Oral two times a day  glucagon  Injectable 1 milliGRAM(s) IntraMuscular once  insulin glargine Injectable (LANTUS) 45 Unit(s) SubCutaneous at bedtime  insulin lispro (ADMELOG) corrective regimen sliding scale   SubCutaneous Before meals and at bedtime  insulin lispro Injectable (ADMELOG) 10 Unit(s) SubCutaneous three times a day before meals  montelukast 10 milliGRAM(s) Oral every 24 hours  senna 2 Tablet(s) Oral at bedtime  valsartan 160 milliGRAM(s) Oral daily  zinc sulfate 220 milliGRAM(s) Oral daily        RADIOLOGY & ADDITIONAL TESTS:    1/27/21< from: Xray Chest 1 View-PORTABLE IMMEDIATE (01.27.21 @ 17:00) The lungs show RIGHT lung base linear atelectasis/infiltrate. Remaining visualized lung parenchyma clear No pneumothorax.    Heart size within normal limits allowing for magnification effect of AP projection. Visualized osseous structures are intact.        MICROBIOLOGY DATA:    COVID-19 Antibody - for prior infection screening (01.28.21 @ 15:26)   COVID-19 IgG Antibody Index: 27.10:     Culture - Blood (01.27.21 @ 22:01)   Specimen Source: .Blood Blood-Peripheral   Culture Results: No growth to date.     Culture - Blood (01.27.21 @ 22:01)   Specimen Source: .Blood Blood-Peripheral   Culture Results: No growth to date.     D-Dimer Assay, Quantitative (01.27.21 @ 16:59) : 433 ng/mL DDU   COVID-19 Antibody - for prior infection screening (01.28.21 @ 15:26)   COVID-19 IgG Antibody Index: 27.10:   COVID-19 PCR . (01.27.21 @ 16:57)   COVID-19 PCR: Detected

## 2021-01-31 NOTE — DISCHARGE NOTE NURSING/CASE MANAGEMENT/SOCIAL WORK - PATIENT PORTAL LINK FT
You can access the FollowMyHealth Patient Portal offered by [facility] by registering at the following website: [email or web address]. By joining [employer]’s FollowMyHealth portal, you will also be able to view your health information using other applications (apps) compatible with our system.

## 2021-01-31 NOTE — PROGRESS NOTE ADULT - PROVIDER SPECIALTY LIST ADULT
Internal Medicine
Infectious Disease
Internal Medicine
Internal Medicine
Pulmonology
Pulmonology
Internal Medicine
Internal Medicine
Pulmonology
Internal Medicine
Internal Medicine

## 2021-01-31 NOTE — PROGRESS NOTE ADULT - ASSESSMENT
Problem/Recommendation - 1:  Problem: COVID-19. Recommendation: isolation precautions  oxygen supp - taper as feasible.  monitor oxygen sat  Monitor  LDH, LFTs, CRP, D-Dimer, Ferritin and procalcitonin  vit C, D and zinc supp  continue dexamethasone.   Bronchodilators.  Montelukast 10 mgs po Qhs  ID F/U   DVT and GI PPX     Problem/Recommendation - 2:  ·  Problem: Hypertension.  Recommendation: monitor BP  cont meds.     Problem/Recommendation - 3:  ·  Problem: Diabetes.  Recommendation: Accuchecks with reg insulin coverage  HBA1C.     Problem/Recommendation - 4:  ·  Problem: Fibromyalgia.  Recommendation: cont meds  Rheum follow up as OP.

## 2021-01-31 NOTE — PROGRESS NOTE ADULT - SUBJECTIVE AND OBJECTIVE BOX
Patient is a 55y old  Female who presents with a chief complaint of fever chills (31 Jan 2021 06:08)  Awake, alert, comfortable in bed in NAD. Doing well on RA. Occasional cough but no sob  INTERVAL HPI/OVERNIGHT EVENTS:      VITAL SIGNS:  T(F): 97.9 (01-31-21 @ 08:14)  HR: 72 (01-31-21 @ 08:14)  BP: 155/56 (01-31-21 @ 08:14)  RR: 16 (01-31-21 @ 08:14)  SpO2: 95% (01-31-21 @ 08:14)  Wt(kg): --  I&O's Detail          REVIEW OF SYSTEMS:    CONSTITUTIONAL:  No fevers, chills, sweats    HEENT:  Eyes:  No diplopia or blurred vision. ENT:  No earache, sore throat or runny nose.    CARDIOVASCULAR:  No pressure, squeezing, tightness, or heaviness about the chest; no palpitations.    RESPIRATORY:  Per HPI    GASTROINTESTINAL:  No abdominal pain, nausea, vomiting or diarrhea.    GENITOURINARY:  No dysuria, frequency or urgency.    NEUROLOGIC:  No paresthesias, fasciculations, seizures or weakness.    PSYCHIATRIC:  No disorder of thought or mood.      PHYSICAL EXAM:    Constitutional: Well developed and nourished  Eyes:Perrla  ENMT: normal  Neck:supple  Respiratory: good air entry  Cardiovascular: S1 S2 regular  Gastrointestinal: Soft, Non tender  Extremities: No edema  Vascular:normal  Neurological:Awake, alert,Ox3  Musculoskeletal:Normal      MEDICATIONS  (STANDING):  ascorbic acid 1000 milliGRAM(s) Oral daily  aspirin  chewable 81 milliGRAM(s) Oral daily  atorvastatin 40 milliGRAM(s) Oral at bedtime  azithromycin  IVPB 500 milliGRAM(s) IV Intermittent every 24 hours  cholecalciferol 2000 Unit(s) Oral every 24 hours  clopidogrel Tablet 75 milliGRAM(s) Oral daily  dexAMETHasone  Injectable 6 milliGRAM(s) IV Push daily  dextrose 40% Gel 15 Gram(s) Oral once  dextrose 5%. 1000 milliLiter(s) (50 mL/Hr) IV Continuous <Continuous>  dextrose 5%. 1000 milliLiter(s) (100 mL/Hr) IV Continuous <Continuous>  enoxaparin Injectable 40 milliGRAM(s) SubCutaneous daily  famotidine    Tablet 40 milliGRAM(s) Oral two times a day  glucagon  Injectable 1 milliGRAM(s) IntraMuscular once  insulin glargine Injectable (LANTUS) 45 Unit(s) SubCutaneous at bedtime  insulin lispro (ADMELOG) corrective regimen sliding scale   SubCutaneous Before meals and at bedtime  insulin lispro Injectable (ADMELOG) 10 Unit(s) SubCutaneous three times a day before meals  montelukast 10 milliGRAM(s) Oral every 24 hours  senna 2 Tablet(s) Oral at bedtime  valsartan 160 milliGRAM(s) Oral daily  zinc sulfate 220 milliGRAM(s) Oral daily    MEDICATIONS  (PRN):  ALBUTerol    90 MICROgram(s) HFA Inhaler 2 Puff(s) Inhalation every 6 hours PRN Bronchospasm  guaiFENesin   Syrup  (Sugar-Free) 200 milliGRAM(s) Oral every 6 hours PRN Cough  sodium chloride 0.65% Nasal 1 Spray(s) Both Nostrils four times a day PRN Nasal Congestion      Allergies    Cipro (Short breath)  penicillin (Rash)    Intolerances        LABS:                        12.4   6.39  )-----------( 316      ( 31 Jan 2021 08:23 )             38.1     01-31    141  |  104  |  21<H>  ----------------------------<  93  3.2<L>   |  33<H>  |  0.56    Ca    9.4      31 Jan 2021 08:23  Phos  3.1     01-31  Mg     2.0     01-31    TPro  6.7  /  Alb  2.7<L>  /  TBili  0.3  /  DBili  x   /  AST  39  /  ALT  70<H>  /  AlkPhos  76  01-31        COVID-19 PCR: Detected: You can help in the fight against COVID-19. [employer] may contact   you to see if you are interested in voluntarily participating in one of   our clinical trials.   This test has been validated by [employer] to be accurate;   though it has not been FDA cleared/approved by the usual pathway   As with all laboratory test, results should be correlated with clinical   findings.   https://www.fda.gov/media/515762/download   https://www.fda.gov/media/549485/download (01.27.21 @ 16:57)       CAPILLARY BLOOD GLUCOSE      POCT Blood Glucose.: 93 mg/dL (31 Jan 2021 07:52)  POCT Blood Glucose.: 213 mg/dL (30 Jan 2021 21:09)  POCT Blood Glucose.: 220 mg/dL (30 Jan 2021 17:12)  POCT Blood Glucose.: 286 mg/dL (30 Jan 2021 11:33)    pro-bnp -- 01-27 @ 16:59     d-dimer 433  01-27 @ 16:59      RADIOLOGY & ADDITIONAL TESTS:    CXR:  < from: Xray Chest 1 View-PORTABLE IMMEDIATE (01.27.21 @ 17:00) >  IMPRESSION:   RIGHT lung base linear atelectasis/airspace disease..    < end of copied text >    Ct scan chest:    ekg;    echo:

## 2021-01-31 NOTE — PROGRESS NOTE ADULT - SUBJECTIVE AND OBJECTIVE BOX
Patient is a 55y old  Female who presents with a chief complaint of fever chills (30 Jan 2021 21:05)    pt seen in icu [  ], reg med floor [ x  ], bed [ x ], chair at bedside [   ], a+o x3 [ x ], lethargic [  ],    nad [x  ]      Allergies    Cipro (Short breath)  penicillin (Rash)        Vitals    T(F): 97.4 (01-30-21 @ 23:59), Max: 97.6 (01-30-21 @ 16:07)  HR: 75 (01-31-21 @ 05:41) (61 - 75)  BP: 139/59 (01-31-21 @ 05:41) (139/59 - 164/56)  RR: 15 (01-30-21 @ 23:59) (15 - 18)  SpO2: 95% (01-30-21 @ 23:59) (95% - 98%)  Wt(kg): --  CAPILLARY BLOOD GLUCOSE      POCT Blood Glucose.: 213 mg/dL (30 Jan 2021 21:09)      Labs                          12.2   7.48  )-----------( 284      ( 30 Jan 2021 09:57 )             37.7       01-30    140  |  104  |  22<H>  ----------------------------<  142<H>  3.7   |  31  |  0.53    Ca    9.5      30 Jan 2021 09:57  Phos  2.7     01-30  Mg     2.0     01-30    TPro  6.8  /  Alb  2.6<L>  /  TBili  0.3  /  DBili  x   /  AST  19  /  ALT  58  /  AlkPhos  78  01-30            .Blood Blood-Peripheral  01-27 @ 22:01   No growth to date.  --  --          Radiology Results      Meds    MEDICATIONS  (STANDING):  ascorbic acid 1000 milliGRAM(s) Oral daily  aspirin  chewable 81 milliGRAM(s) Oral daily  atorvastatin 40 milliGRAM(s) Oral at bedtime  azithromycin  IVPB 500 milliGRAM(s) IV Intermittent every 24 hours  cholecalciferol 2000 Unit(s) Oral every 24 hours  clopidogrel Tablet 75 milliGRAM(s) Oral daily  dexAMETHasone  Injectable 6 milliGRAM(s) IV Push daily  dextrose 40% Gel 15 Gram(s) Oral once  dextrose 5%. 1000 milliLiter(s) (50 mL/Hr) IV Continuous <Continuous>  dextrose 5%. 1000 milliLiter(s) (100 mL/Hr) IV Continuous <Continuous>  enoxaparin Injectable 40 milliGRAM(s) SubCutaneous daily  famotidine    Tablet 40 milliGRAM(s) Oral two times a day  glucagon  Injectable 1 milliGRAM(s) IntraMuscular once  insulin glargine Injectable (LANTUS) 45 Unit(s) SubCutaneous at bedtime  insulin lispro (ADMELOG) corrective regimen sliding scale   SubCutaneous Before meals and at bedtime  insulin lispro Injectable (ADMELOG) 10 Unit(s) SubCutaneous three times a day before meals  montelukast 10 milliGRAM(s) Oral every 24 hours  senna 2 Tablet(s) Oral at bedtime  valsartan 160 milliGRAM(s) Oral daily  zinc sulfate 220 milliGRAM(s) Oral daily      MEDICATIONS  (PRN):  ALBUTerol    90 MICROgram(s) HFA Inhaler 2 Puff(s) Inhalation every 6 hours PRN Bronchospasm  guaiFENesin   Syrup  (Sugar-Free) 200 milliGRAM(s) Oral every 6 hours PRN Cough  sodium chloride 0.65% Nasal 1 Spray(s) Both Nostrils four times a day PRN Nasal Congestion      Physical Exam    Neuro :  no focal deficits  Respiratory: CTA B/L  CV: RRR, S1S2, no murmurs,   Abdominal: Soft, NT, ND +BS,  Extremities: No edema, + peripheral pulses    ASSESSMENT    right lower lobe pneumonia possibly bacterial,   covid 19 infection,   h/o NIDDM,   HTN,   CAD (coronary artery disease)  Fibromyalgia  cholecystectomy  appendectomy  tonsillectomy        PLAN    cont zithromax,   id f/u  f/u ucx   blood cx neg noted above  no remdesivir given covid ab neg noted    cont decadron,   cont vit c, vitamin d, zinc, pepcid, singulair,   contact and airborne isolation,   pulm f/u  cont albuterol inhaler,   afebrile   cont tylenol prn,   cont robitussin prn   O2 sat (90% - 98%) on 4L n/c  O2 via nasal canula and taper as tolerated  procalcitonin, D-dimer, esr, crp, ldh, ferritin, lactate noted   f/u rept markers in 2 days,   hgba1c 9.6   endo f/u   lantus incr to 45u qhs  premeal incr to admelog to 10 u  cont HSS  cont current meds       Patient is a 55y old  Female who presents with a chief complaint of fever chills (30 Jan 2021 21:05)    pt seen in icu [  ], reg med floor [ x  ], bed [ x ], chair at bedside [   ], a+o x3 [ x ], lethargic [  ],    nad [x  ]      pt states feeling better    Allergies    Cipro (Short breath)  penicillin (Rash)        Vitals    T(F): 97.4 (01-30-21 @ 23:59), Max: 97.6 (01-30-21 @ 16:07)  HR: 75 (01-31-21 @ 05:41) (61 - 75)  BP: 139/59 (01-31-21 @ 05:41) (139/59 - 164/56)  RR: 15 (01-30-21 @ 23:59) (15 - 18)  SpO2: 95% (01-30-21 @ 23:59) (95% - 98%)  Wt(kg): --  CAPILLARY BLOOD GLUCOSE      POCT Blood Glucose.: 213 mg/dL (30 Jan 2021 21:09)      Labs                          12.2   7.48  )-----------( 284      ( 30 Jan 2021 09:57 )             37.7       01-30    140  |  104  |  22<H>  ----------------------------<  142<H>  3.7   |  31  |  0.53    Ca    9.5      30 Jan 2021 09:57  Phos  2.7     01-30  Mg     2.0     01-30    TPro  6.8  /  Alb  2.6<L>  /  TBili  0.3  /  DBili  x   /  AST  19  /  ALT  58  /  AlkPhos  78  01-30            .Blood Blood-Peripheral  01-27 @ 22:01   No growth to date.  --  --          Radiology Results      Meds    MEDICATIONS  (STANDING):  ascorbic acid 1000 milliGRAM(s) Oral daily  aspirin  chewable 81 milliGRAM(s) Oral daily  atorvastatin 40 milliGRAM(s) Oral at bedtime  azithromycin  IVPB 500 milliGRAM(s) IV Intermittent every 24 hours  cholecalciferol 2000 Unit(s) Oral every 24 hours  clopidogrel Tablet 75 milliGRAM(s) Oral daily  dexAMETHasone  Injectable 6 milliGRAM(s) IV Push daily  dextrose 40% Gel 15 Gram(s) Oral once  dextrose 5%. 1000 milliLiter(s) (50 mL/Hr) IV Continuous <Continuous>  dextrose 5%. 1000 milliLiter(s) (100 mL/Hr) IV Continuous <Continuous>  enoxaparin Injectable 40 milliGRAM(s) SubCutaneous daily  famotidine    Tablet 40 milliGRAM(s) Oral two times a day  glucagon  Injectable 1 milliGRAM(s) IntraMuscular once  insulin glargine Injectable (LANTUS) 45 Unit(s) SubCutaneous at bedtime  insulin lispro (ADMELOG) corrective regimen sliding scale   SubCutaneous Before meals and at bedtime  insulin lispro Injectable (ADMELOG) 10 Unit(s) SubCutaneous three times a day before meals  montelukast 10 milliGRAM(s) Oral every 24 hours  senna 2 Tablet(s) Oral at bedtime  valsartan 160 milliGRAM(s) Oral daily  zinc sulfate 220 milliGRAM(s) Oral daily      MEDICATIONS  (PRN):  ALBUTerol    90 MICROgram(s) HFA Inhaler 2 Puff(s) Inhalation every 6 hours PRN Bronchospasm  guaiFENesin   Syrup  (Sugar-Free) 200 milliGRAM(s) Oral every 6 hours PRN Cough  sodium chloride 0.65% Nasal 1 Spray(s) Both Nostrils four times a day PRN Nasal Congestion      Physical Exam    Neuro :  no focal deficits  Respiratory: CTA B/L  CV: RRR, S1S2, no murmurs,   Abdominal: Soft, NT, ND +BS,  Extremities: No edema, + peripheral pulses    ASSESSMENT    right lower lobe pneumonia possibly bacterial,   covid 19 infection,   h/o NIDDM,   HTN,   CAD (coronary artery disease)  Fibromyalgia  cholecystectomy  appendectomy  tonsillectomy        PLAN    cont zithromax po x 2 more days,   id f/u  blood cx neg noted above  no remdesivir given covid ab neg noted    cont decadron,   cont vit c, vitamin d, zinc, pepcid, singulair,   contact and airborne isolation,   pulm f/u  cont albuterol inhaler,   afebrile   cont tylenol prn,   cont robitussin prn   O2 sat (95% - 98%) ra  procalcitonin, D-dimer, esr, crp, ldh, ferritin, lactate noted   hgba1c 9.6   endo f/u   cont lantus  45u qhs  cont admelog 10 u  cont HSS  cont current meds   pt stable for d/c

## 2021-01-31 NOTE — PROGRESS NOTE ADULT - REASON FOR ADMISSION
fever chills

## 2021-01-31 NOTE — PROGRESS NOTE ADULT - ASSESSMENT
Patient is a 55y old  Female who is post menopausal, with PMHx of NIDDM and HTN, presents to the ER for evaluation of fever, chills, and myalgias and diagnosed with COVID 19 since 1/19/21. She started having dry cough, worsening chills, and a few episodes of diarrhea. Patient endorses loss of smell and loss of appetite but denies chest pain, or any other acute complaints. On admission, she found to have low grade fever, tachycardia and tachypnea with hypoxia, and now saturating well on supplemental oxygenation via NC. She has started on Dexamethasone and the ID consult requested to assist with further management.     # Acute Respiratory failure - most  likely due to COVID -19  # SARS COV-2- Not a candidate for Remdesivir since titers is positive    would recommend:    1. OOB to chair   2. Continue Dexamethasone to complete the course OR until discharge   3. Supplemental oxygenation and bronchodilator as needed  4. Continue supportive care including AC  5. COVID precautions     d/w Dr. Rivero      Attending Attestation:    Spent more than 35 minutes on total encounter, more than 50 % of the visit was spent counseling and/or coordinating care by the Attending physician.

## 2021-02-01 ENCOUNTER — TRANSCRIPTION ENCOUNTER (OUTPATIENT)
Age: 56
End: 2021-02-01

## 2021-02-01 LAB
CULTURE RESULTS: SIGNIFICANT CHANGE UP
CULTURE RESULTS: SIGNIFICANT CHANGE UP
SPECIMEN SOURCE: SIGNIFICANT CHANGE UP
SPECIMEN SOURCE: SIGNIFICANT CHANGE UP

## 2021-03-03 ENCOUNTER — EMERGENCY (EMERGENCY)
Facility: HOSPITAL | Age: 56
LOS: 1 days | Discharge: HOME | End: 2021-03-03
Attending: EMERGENCY MEDICINE
Payer: MEDICAID

## 2021-03-03 VITALS
OXYGEN SATURATION: 96 % | RESPIRATION RATE: 20 BRPM | TEMPERATURE: 98 F | HEART RATE: 97 BPM | DIASTOLIC BLOOD PRESSURE: 74 MMHG | SYSTOLIC BLOOD PRESSURE: 165 MMHG

## 2021-03-03 VITALS
HEART RATE: 108 BPM | TEMPERATURE: 98 F | WEIGHT: 225.09 LBS | RESPIRATION RATE: 20 BRPM | OXYGEN SATURATION: 97 % | DIASTOLIC BLOOD PRESSURE: 96 MMHG | HEIGHT: 69 IN | SYSTOLIC BLOOD PRESSURE: 171 MMHG

## 2021-03-03 DIAGNOSIS — Z90.49 ACQUIRED ABSENCE OF OTHER SPECIFIED PARTS OF DIGESTIVE TRACT: Chronic | ICD-10-CM

## 2021-03-03 DIAGNOSIS — Z90.89 ACQUIRED ABSENCE OF OTHER ORGANS: Chronic | ICD-10-CM

## 2021-03-03 DIAGNOSIS — Z98.891 HISTORY OF UTERINE SCAR FROM PREVIOUS SURGERY: Chronic | ICD-10-CM

## 2021-03-03 PROBLEM — I25.10 ATHEROSCLEROTIC HEART DISEASE OF NATIVE CORONARY ARTERY WITHOUT ANGINA PECTORIS: Chronic | Status: ACTIVE | Noted: 2021-01-27

## 2021-03-03 LAB
ALBUMIN SERPL ELPH-MCNC: 3.3 G/DL — LOW (ref 3.5–5)
ALP SERPL-CCNC: 113 U/L — SIGNIFICANT CHANGE UP (ref 40–120)
ALT FLD-CCNC: 49 U/L DA — SIGNIFICANT CHANGE UP (ref 10–60)
ANION GAP SERPL CALC-SCNC: 5 MMOL/L — SIGNIFICANT CHANGE UP (ref 5–17)
APTT BLD: 24.1 SEC — LOW (ref 27.5–35.5)
AST SERPL-CCNC: 17 U/L — SIGNIFICANT CHANGE UP (ref 10–40)
BASOPHILS # BLD AUTO: 0.03 K/UL — SIGNIFICANT CHANGE UP (ref 0–0.2)
BASOPHILS NFR BLD AUTO: 0.4 % — SIGNIFICANT CHANGE UP (ref 0–2)
BILIRUB SERPL-MCNC: 0.2 MG/DL — SIGNIFICANT CHANGE UP (ref 0.2–1.2)
BUN SERPL-MCNC: 21 MG/DL — HIGH (ref 7–18)
CALCIUM SERPL-MCNC: 9.6 MG/DL — SIGNIFICANT CHANGE UP (ref 8.4–10.5)
CHLORIDE SERPL-SCNC: 104 MMOL/L — SIGNIFICANT CHANGE UP (ref 96–108)
CO2 SERPL-SCNC: 29 MMOL/L — SIGNIFICANT CHANGE UP (ref 22–31)
CREAT SERPL-MCNC: 0.65 MG/DL — SIGNIFICANT CHANGE UP (ref 0.5–1.3)
D DIMER BLD IA.RAPID-MCNC: 234 NG/ML DDU — HIGH
EOSINOPHIL # BLD AUTO: 0.1 K/UL — SIGNIFICANT CHANGE UP (ref 0–0.5)
EOSINOPHIL NFR BLD AUTO: 1.3 % — SIGNIFICANT CHANGE UP (ref 0–6)
GLUCOSE SERPL-MCNC: 233 MG/DL — HIGH (ref 70–99)
HCT VFR BLD CALC: 40.2 % — SIGNIFICANT CHANGE UP (ref 34.5–45)
HGB BLD-MCNC: 13.4 G/DL — SIGNIFICANT CHANGE UP (ref 11.5–15.5)
IMM GRANULOCYTES NFR BLD AUTO: 0.4 % — SIGNIFICANT CHANGE UP (ref 0–1.5)
INR BLD: 0.98 RATIO — SIGNIFICANT CHANGE UP (ref 0.88–1.16)
LACTATE SERPL-SCNC: 1.3 MMOL/L — SIGNIFICANT CHANGE UP (ref 0.7–2)
LYMPHOCYTES # BLD AUTO: 3.03 K/UL — SIGNIFICANT CHANGE UP (ref 1–3.3)
LYMPHOCYTES # BLD AUTO: 39.7 % — SIGNIFICANT CHANGE UP (ref 13–44)
MCHC RBC-ENTMCNC: 31.1 PG — SIGNIFICANT CHANGE UP (ref 27–34)
MCHC RBC-ENTMCNC: 33.3 GM/DL — SIGNIFICANT CHANGE UP (ref 32–36)
MCV RBC AUTO: 93.3 FL — SIGNIFICANT CHANGE UP (ref 80–100)
MONOCYTES # BLD AUTO: 0.81 K/UL — SIGNIFICANT CHANGE UP (ref 0–0.9)
MONOCYTES NFR BLD AUTO: 10.6 % — SIGNIFICANT CHANGE UP (ref 2–14)
NEUTROPHILS # BLD AUTO: 3.63 K/UL — SIGNIFICANT CHANGE UP (ref 1.8–7.4)
NEUTROPHILS NFR BLD AUTO: 47.6 % — SIGNIFICANT CHANGE UP (ref 43–77)
NRBC # BLD: 0 /100 WBCS — SIGNIFICANT CHANGE UP (ref 0–0)
NT-PROBNP SERPL-SCNC: 9 PG/ML — SIGNIFICANT CHANGE UP (ref 0–125)
PLATELET # BLD AUTO: 260 K/UL — SIGNIFICANT CHANGE UP (ref 150–400)
POTASSIUM SERPL-MCNC: 4.1 MMOL/L — SIGNIFICANT CHANGE UP (ref 3.5–5.3)
POTASSIUM SERPL-SCNC: 4.1 MMOL/L — SIGNIFICANT CHANGE UP (ref 3.5–5.3)
PROT SERPL-MCNC: 6.9 G/DL — SIGNIFICANT CHANGE UP (ref 6–8.3)
PROTHROM AB SERPL-ACNC: 11.7 SEC — SIGNIFICANT CHANGE UP (ref 10.6–13.6)
RBC # BLD: 4.31 M/UL — SIGNIFICANT CHANGE UP (ref 3.8–5.2)
RBC # FLD: 13.2 % — SIGNIFICANT CHANGE UP (ref 10.3–14.5)
SARS-COV-2 RNA SPEC QL NAA+PROBE: SIGNIFICANT CHANGE UP
SODIUM SERPL-SCNC: 138 MMOL/L — SIGNIFICANT CHANGE UP (ref 135–145)
TROPONIN I SERPL-MCNC: <0.015 NG/ML — SIGNIFICANT CHANGE UP (ref 0–0.04)
WBC # BLD: 7.63 K/UL — SIGNIFICANT CHANGE UP (ref 3.8–10.5)
WBC # FLD AUTO: 7.63 K/UL — SIGNIFICANT CHANGE UP (ref 3.8–10.5)

## 2021-03-03 PROCEDURE — 85379 FIBRIN DEGRADATION QUANT: CPT

## 2021-03-03 PROCEDURE — 83880 ASSAY OF NATRIURETIC PEPTIDE: CPT

## 2021-03-03 PROCEDURE — 87635 SARS-COV-2 COVID-19 AMP PRB: CPT

## 2021-03-03 PROCEDURE — 83605 ASSAY OF LACTIC ACID: CPT

## 2021-03-03 PROCEDURE — 84484 ASSAY OF TROPONIN QUANT: CPT

## 2021-03-03 PROCEDURE — 71045 X-RAY EXAM CHEST 1 VIEW: CPT

## 2021-03-03 PROCEDURE — 36415 COLL VENOUS BLD VENIPUNCTURE: CPT

## 2021-03-03 PROCEDURE — 85730 THROMBOPLASTIN TIME PARTIAL: CPT

## 2021-03-03 PROCEDURE — 85610 PROTHROMBIN TIME: CPT

## 2021-03-03 PROCEDURE — 82728 ASSAY OF FERRITIN: CPT

## 2021-03-03 PROCEDURE — 99284 EMERGENCY DEPT VISIT MOD MDM: CPT | Mod: 25

## 2021-03-03 PROCEDURE — U0005: CPT

## 2021-03-03 PROCEDURE — 80053 COMPREHEN METABOLIC PANEL: CPT

## 2021-03-03 PROCEDURE — 71275 CT ANGIOGRAPHY CHEST: CPT | Mod: 26,MA

## 2021-03-03 PROCEDURE — 93005 ELECTROCARDIOGRAM TRACING: CPT

## 2021-03-03 PROCEDURE — 71275 CT ANGIOGRAPHY CHEST: CPT

## 2021-03-03 PROCEDURE — 99285 EMERGENCY DEPT VISIT HI MDM: CPT

## 2021-03-03 PROCEDURE — 85025 COMPLETE CBC W/AUTO DIFF WBC: CPT

## 2021-03-03 PROCEDURE — 71045 X-RAY EXAM CHEST 1 VIEW: CPT | Mod: 26

## 2021-03-03 NOTE — ED PROVIDER NOTE - NSFOLLOWUPINSTRUCTIONS_ED_ALL_ED_FT
Dyspnea    WHAT YOU NEED TO KNOW:    Dyspnea is breathing difficulty or discomfort. You may have labored, painful, or shallow breathing. You may feel breathless or short of breath. Dyspnea can occur during rest or with activity. You may have dyspnea for a short time, or it might become chronic. Dyspnea is often a symptom of a disease or condition.    DISCHARGE INSTRUCTIONS:    Return to the emergency department if:   •Your signs and symptoms are the same or worse within 24 hours of treatment.       •You have shaking chills or a fever over 102°F.       •You have new pain, pressure, or tightness in your chest.       •You have a new or worse cough or wheezing, or you cough up blood.      •You feel like you cannot get enough air.      •The skin over your ribs or on your neck sinks in when you breathe.       •You have a severe headache with vomiting and abdominal pain.       •You feel confused or dizzy.      Contact your healthcare provider or specialist if:   •You have questions or concerns about your condition or care.          Medicines:    •Medicines may be used to treat the cause of your dyspnea. Medicines may reduce swelling in your airway or decrease extra fluid from around your heart or lungs. Other medicines may be used to decrease anxiety and help you feel calm and relaxed.      •Take your medicine as directed. Contact your healthcare provider if you think your medicine is not helping or if you have side effects. Tell him or her if you are allergic to any medicine. Keep a list of the medicines, vitamins, and herbs you take. Include the amounts, and when and why you take them. Bring the list or the pill bottles to follow-up visits. Carry your medicine list with you in case of an emergency.      Manage long-term dyspnea:   •Create an action plan. You and your healthcare provider can work together to create a plan for how to handle episodes of dyspnea. The plan can include daily activities, treatment changes, and what to do if you have severe breathing problems.      •Lean forward on your elbows when you sit. This helps your lungs expand and may make it easier to breathe.      •Use pursed-lip breathing any time you feel short of breath. Breathe in through your nose and then slowly breathe out through your mouth with your lips slightly puckered. It should take you twice as long to breathe out as it did to breathe in.  Breathe in Breathe out           •Do not smoke. Nicotine and other chemicals in cigarettes and cigars can cause lung damage and make it harder to breathe. Ask your healthcare provider for information if you currently smoke and need help to quit. E-cigarettes or smokeless tobacco still contain nicotine. Talk to your healthcare provider before you use these products.       •Reach or maintain a healthy weight. Your healthcare provider can help you create a safe weight loss plan if you are overweight.      •Exercise as directed. Exercise can help your lungs work more easily. Exercise can also help you lose weight if needed. Try to get at least 30 minutes of exercise most days of the week. Your healthcare provider can help you create an exercise plan that is safe for you.      Follow up with your healthcare provider or specialist as directed: Write down your questions so you remember to ask them during your visits.       © Copyright [employer] 2021           back to top                          © Copyright [employer] 2021

## 2021-03-03 NOTE — ED ADULT TRIAGE NOTE - CHIEF COMPLAINT QUOTE
c/o shortness of breath states " walk outside this afternoon and got short of breath " chest heavy . reports was covid + in january 2021

## 2021-03-03 NOTE — ED PROVIDER NOTE - PROGRESS NOTE DETAILS
Pt improved.  CT shows no PE.  Informed Pt of nonspecific finding, radiologist indicating possible pulmonary hypertension and Pt advised return precautions and f/u with pulmonology.

## 2021-03-03 NOTE — ED ADULT NURSE NOTE - OBJECTIVE STATEMENT
Pt AOx4, ambulatory, c/o SOB, pt was DX covid positive in January 2021. Pt states she is diabetic, and her diabetes medication was changed during her last admission to the hospital, ever since she has been feeling "bloated" and her stomach is getting more distended than usual. Pt denies CP. EKG done, pt placed on cardiac monitor, no signs of distress noted.

## 2021-03-03 NOTE — ED PROVIDER NOTE - OBJECTIVE STATEMENT
56 y/o woman, h/o CAD w/stent, HTN, DM, fibromyalgia, c/o several days of dyspnea with exertion, worse this AM.  She has also had BG running in the 300's despite taking medication.  Also c/o abdominal bloating but no pain.  She denies chest pain.  No fever/cough.  She had COVID in January 2021.  No h/o DVT/PE.

## 2021-03-03 NOTE — ED PROVIDER NOTE - MUSCULOSKELETAL, MLM
No leg swelling/tenderness; Spine appears normal, range of motion is not limited, no muscle or joint tenderness

## 2021-03-03 NOTE — ED PROVIDER NOTE - PATIENT PORTAL LINK FT
You can access the FollowMyHealth Patient Portal offered by [facility] by registering at the following website: [email or web address]. By joining [employer]’s FollowMyHealth portal, you will also be able to view your health information using other applications (apps) compatible with our system.

## 2021-03-03 NOTE — PROGRESS NOTE ADULT - PROBLEM SELECTOR PROBLEM 4
3/3/2021 
 
Patient: Nuno Ortiz  
YOB: 1955  
Date of Visit: 3/3/2021  
 
Teddy Bonilla MD 
[id number] [facility] [id number] 
Via Fax: [phone] 
 
Dear Teddy Bonilla MD, 
 
 
Thank you for referring Mr. Nuno Ortiz to [facility] ENDOCRINOLOGY for evaluation. My notes for this consultation are attached. 
 
If you have questions, please do not hesitate to call me. I look forward to following your patient along with you. 
 
 
Sincerely, 
 
Heidy Rollins MD 
 

CAD (coronary artery disease)

## 2021-03-04 LAB
FERRITIN SERPL-MCNC: 222 NG/ML — HIGH (ref 15–150)
TROPONIN I SERPL-MCNC: <0.015 NG/ML — SIGNIFICANT CHANGE UP (ref 0–0.04)

## 2021-03-17 NOTE — PATIENT PROFILE ADULT - NSPROGENSOURCEINFO_GEN_A_NUR
Additional Notes: In baths put mineral oil, towel dry the immediately apply lotion. Apply Triamcinolone only when flared
Render Risk Assessment In Note?: no
Detail Level: Simple
patient

## 2021-06-03 NOTE — PATIENT PROFILE ADULT - HOW PATIENT ADDRESSED, PROFILE
Eve Name, allergies  and birthdate verified with patient.  Patient received acetaminophen 400mg liquid by mouth to help reduce fever of 104.0 F.

## 2021-12-01 PROCEDURE — G9005: CPT

## 2021-12-10 NOTE — ED PROVIDER NOTE - CHPI ED SYMPTOMS NEG
Addended by: GEOFF GANNON on: 12/10/2021 04:19 PM     Modules accepted: Orders    
no chills/no fever

## 2022-01-04 NOTE — ED PROVIDER NOTE - QUALITY
RETINA IS ATTACHED OU: LATTICE DEGENERATION OU; NO HOLES OR TEARS SEEN ON DILATED EXAM TODAY. RETINAL DETACHMENT SIGNS AND SYMPTOMS REVIEWED. sharp

## 2024-07-22 NOTE — ED PROVIDER NOTE - CLINICAL SUMMARY MEDICAL DECISION MAKING FREE TEXT BOX
No 56 y/o woman, h/o CAD w/stent, HTN, DM, fibromyalgia, c/o several days of dyspnea with exertion, worse this AM.  She has also had BG running in the 300's despite taking medication--labs, EKG, CXR, reassess.

## 2024-08-13 NOTE — ED PROVIDER NOTE - NS ED MD DISPO ADMITTING SERVICE
18 gauge x 10cm Midline placed to patient's right BASILIC vein with the use of ultrasound guidance.     Ultrasound guidance: yes  Vessel Caliber: large and patent, compressibility normal  Needle advanced into vessel with real time Ultrasound guidance.  Guidewire confirmed in vessel.  Image recorded and saved.  Sterile sheath used.  Sterile dressing applied  Arm circumference- 24CM  Dressing dated     
MED

## 2025-02-19 NOTE — ED ADULT NURSE NOTE - ISOLATION PROVIDED EDUCATION
Subjective   Patient ID: Izabela Nunes is a 40 y.o. female who presents for Cough (Sick 6 days, fever off/on, fatigue, trouble breathing, cough- small amounts- yellow, telehealth- yesterday- pulmonologist- stated something in lungs- go to ER, or PCP. Was told either RSV, or Flu.).    Cough  Associated symptoms include eye redness and postnasal drip.    Pt here with sinusitis issues and cough    Review of Systems   HENT:  Positive for congestion, postnasal drip, sinus pressure and sinus pain.    Eyes:  Positive for discharge and redness.   Respiratory:  Positive for cough.    All other systems reviewed and are negative.      Objective   /70 (BP Location: Right arm, Patient Position: Sitting, BP Cuff Size: Adult)   Pulse 96   Temp 36.4 °C (97.5 °F) (Temporal)   Ht 1.524 m (5')   Wt 67.6 kg (149 lb)   SpO2 98%   BMI 29.10 kg/m²     Physical Exam  Vitals reviewed.   Constitutional:       Appearance: Normal appearance.   HENT:      Right Ear: Tympanic membrane, ear canal and external ear normal.      Left Ear: Tympanic membrane, ear canal and external ear normal.      Nose: Congestion and rhinorrhea present.      Mouth/Throat:      Mouth: Mucous membranes are moist.      Pharynx: Posterior oropharyngeal erythema present.   Neurological:      Mental Status: She is alert.   Psychiatric:         Mood and Affect: Mood normal.         Behavior: Behavior normal.         Assessment/Plan   Problem List Items Addressed This Visit    None  Visit Diagnoses         Codes    Acute non-recurrent maxillary sinusitis    -  Primary J01.00    Relevant Orders    POCT Influenza A/B manually resulted    [facility] MISCELLANEOUS TEST (REFRIGERATED)    Acute cough     R05.1    Relevant Orders    POCT Influenza A/B manually resulted    [facility] MISCELLANEOUS TEST (REFRIGERATED)                Family